# Patient Record
Sex: MALE | Race: WHITE | NOT HISPANIC OR LATINO | Employment: FULL TIME | URBAN - METROPOLITAN AREA
[De-identification: names, ages, dates, MRNs, and addresses within clinical notes are randomized per-mention and may not be internally consistent; named-entity substitution may affect disease eponyms.]

---

## 2017-08-03 ENCOUNTER — HOSPITAL ENCOUNTER (OUTPATIENT)
Dept: ORTHOPEDIC SURGERY | Facility: CLINIC | Age: 57
Discharge: HOME | End: 2017-08-03
Attending: PODIATRIST | Admitting: PODIATRIST

## 2017-09-25 ENCOUNTER — HOSPITAL ENCOUNTER (OUTPATIENT)
Dept: FAMILY MEDICINE CLINIC | Facility: CLINIC | Age: 57
Setting detail: SPECIMEN
Discharge: HOME | End: 2017-09-25
Attending: NURSE PRACTITIONER | Admitting: NURSE PRACTITIONER

## 2019-09-19 RX ORDER — ATENOLOL 50 MG/1
TABLET ORAL
Qty: 180 TABLET | Refills: 4 | Status: SHIPPED | OUTPATIENT
Start: 2019-09-19 | End: 2019-12-04 | Stop reason: SDUPTHER

## 2019-09-23 RX ORDER — LOSARTAN POTASSIUM 25 MG/1
TABLET ORAL
Qty: 90 TABLET | Refills: 4 | Status: SHIPPED | OUTPATIENT
Start: 2019-09-23 | End: 2019-09-25 | Stop reason: SDUPTHER

## 2019-09-25 ENCOUNTER — TELEPHONE (OUTPATIENT)
Dept: FAMILY MEDICINE CLINIC | Facility: CLINIC | Age: 59
End: 2019-09-25

## 2019-09-25 RX ORDER — LOSARTAN POTASSIUM 25 MG/1
25 TABLET ORAL DAILY
Qty: 90 TABLET | Refills: 2 | Status: SHIPPED | OUTPATIENT
Start: 2019-09-25 | End: 2019-12-04 | Stop reason: SDUPTHER

## 2019-09-25 NOTE — TELEPHONE ENCOUNTER
Patient is waiting on mail order to arrive and will run out of Losartan 25mg once daily.  Needs about 10 days sent in please.

## 2019-11-12 ENCOUNTER — TELEPHONE (OUTPATIENT)
Dept: FAMILY MEDICINE CLINIC | Facility: CLINIC | Age: 59
End: 2019-11-12

## 2019-11-12 NOTE — TELEPHONE ENCOUNTER
Pt is coming in for fasting labs on 11/22/19 for his physical. Please put lab order in. Thank you.

## 2019-11-13 NOTE — TELEPHONE ENCOUNTER
Are there specific labs you drawl for a physical? Please advise with what labs you would like drawn.

## 2019-11-14 DIAGNOSIS — Z00.00 ANNUAL PHYSICAL EXAM: Primary | ICD-10-CM

## 2019-11-14 DIAGNOSIS — Z12.5 SCREENING FOR PROSTATE CANCER: ICD-10-CM

## 2019-11-21 ENCOUNTER — LAB (OUTPATIENT)
Dept: FAMILY MEDICINE CLINIC | Facility: CLINIC | Age: 59
End: 2019-11-21

## 2019-11-21 DIAGNOSIS — Z12.5 SCREENING FOR PROSTATE CANCER: ICD-10-CM

## 2019-11-21 DIAGNOSIS — Z00.00 ANNUAL PHYSICAL EXAM: ICD-10-CM

## 2019-11-21 LAB
ALBUMIN SERPL-MCNC: 4 G/DL (ref 3.5–5.2)
ALBUMIN/GLOB SERPL: 1.2 G/DL
ALP SERPL-CCNC: 54 U/L (ref 39–117)
ALT SERPL W P-5'-P-CCNC: 26 U/L (ref 1–41)
ANION GAP SERPL CALCULATED.3IONS-SCNC: 14.1 MMOL/L (ref 5–15)
AST SERPL-CCNC: 19 U/L (ref 1–40)
BASOPHILS # BLD AUTO: 0.04 10*3/MM3 (ref 0–0.2)
BASOPHILS NFR BLD AUTO: 0.6 % (ref 0–1.5)
BILIRUB SERPL-MCNC: 2.2 MG/DL (ref 0.2–1.2)
BUN BLD-MCNC: 18 MG/DL (ref 8–23)
BUN/CREAT SERPL: 18.6 (ref 7–25)
CALCIUM SPEC-SCNC: 9.2 MG/DL (ref 8.6–10.5)
CHLORIDE SERPL-SCNC: 102 MMOL/L (ref 98–107)
CHOLEST SERPL-MCNC: 155 MG/DL (ref 0–200)
CO2 SERPL-SCNC: 24.9 MMOL/L (ref 22–29)
CREAT BLD-MCNC: 0.97 MG/DL (ref 0.76–1.27)
DEPRECATED RDW RBC AUTO: 40.1 FL (ref 37–54)
EOSINOPHIL # BLD AUTO: 0.18 10*3/MM3 (ref 0–0.4)
EOSINOPHIL NFR BLD AUTO: 2.9 % (ref 0.3–6.2)
ERYTHROCYTE [DISTWIDTH] IN BLOOD BY AUTOMATED COUNT: 12.1 % (ref 12.3–15.4)
GFR SERPL CREATININE-BSD FRML MDRD: 79 ML/MIN/1.73
GLOBULIN UR ELPH-MCNC: 3.4 GM/DL
GLUCOSE BLD-MCNC: 102 MG/DL (ref 65–99)
HBA1C MFR BLD: 4.6 % (ref 3.5–5.6)
HCT VFR BLD AUTO: 47.3 % (ref 37.5–51)
HDLC SERPL-MCNC: 42 MG/DL (ref 40–60)
HGB BLD-MCNC: 16.4 G/DL (ref 13–17.7)
IMM GRANULOCYTES # BLD AUTO: 0.03 10*3/MM3 (ref 0–0.05)
IMM GRANULOCYTES NFR BLD AUTO: 0.5 % (ref 0–0.5)
LDLC SERPL CALC-MCNC: 96 MG/DL (ref 0–100)
LDLC/HDLC SERPL: 2.28 {RATIO}
LYMPHOCYTES # BLD AUTO: 1.31 10*3/MM3 (ref 0.7–3.1)
LYMPHOCYTES NFR BLD AUTO: 21.2 % (ref 19.6–45.3)
MCH RBC QN AUTO: 31.2 PG (ref 26.6–33)
MCHC RBC AUTO-ENTMCNC: 34.7 G/DL (ref 31.5–35.7)
MCV RBC AUTO: 89.9 FL (ref 79–97)
MONOCYTES # BLD AUTO: 0.55 10*3/MM3 (ref 0.1–0.9)
MONOCYTES NFR BLD AUTO: 8.9 % (ref 5–12)
NEUTROPHILS # BLD AUTO: 4.06 10*3/MM3 (ref 1.7–7)
NEUTROPHILS NFR BLD AUTO: 65.9 % (ref 42.7–76)
NRBC BLD AUTO-RTO: 0 /100 WBC (ref 0–0.2)
PLATELET # BLD AUTO: 251 10*3/MM3 (ref 140–450)
PMV BLD AUTO: 9.8 FL (ref 6–12)
POTASSIUM BLD-SCNC: 4.1 MMOL/L (ref 3.5–5.2)
PROT SERPL-MCNC: 7.4 G/DL (ref 6–8.5)
PSA SERPL-MCNC: 2.73 NG/ML (ref 0–4)
RBC # BLD AUTO: 5.26 10*6/MM3 (ref 4.14–5.8)
SODIUM BLD-SCNC: 141 MMOL/L (ref 136–145)
TRIGL SERPL-MCNC: 87 MG/DL (ref 0–150)
VLDLC SERPL-MCNC: 17.4 MG/DL (ref 5–40)
WBC NRBC COR # BLD: 6.17 10*3/MM3 (ref 3.4–10.8)

## 2019-11-21 PROCEDURE — 80061 LIPID PANEL: CPT | Performed by: FAMILY MEDICINE

## 2019-11-21 PROCEDURE — 36415 COLL VENOUS BLD VENIPUNCTURE: CPT

## 2019-11-21 PROCEDURE — G0103 PSA SCREENING: HCPCS | Performed by: FAMILY MEDICINE

## 2019-11-21 PROCEDURE — 80053 COMPREHEN METABOLIC PANEL: CPT | Performed by: FAMILY MEDICINE

## 2019-11-21 PROCEDURE — 85025 COMPLETE CBC W/AUTO DIFF WBC: CPT | Performed by: FAMILY MEDICINE

## 2019-11-21 PROCEDURE — 83036 HEMOGLOBIN GLYCOSYLATED A1C: CPT | Performed by: FAMILY MEDICINE

## 2019-12-04 ENCOUNTER — OFFICE VISIT (OUTPATIENT)
Dept: FAMILY MEDICINE CLINIC | Facility: CLINIC | Age: 59
End: 2019-12-04

## 2019-12-04 ENCOUNTER — RESULTS ENCOUNTER (OUTPATIENT)
Dept: FAMILY MEDICINE CLINIC | Facility: CLINIC | Age: 59
End: 2019-12-04

## 2019-12-04 VITALS
HEART RATE: 76 BPM | DIASTOLIC BLOOD PRESSURE: 74 MMHG | SYSTOLIC BLOOD PRESSURE: 122 MMHG | BODY MASS INDEX: 32.05 KG/M2 | TEMPERATURE: 97.8 F | RESPIRATION RATE: 16 BRPM | WEIGHT: 277 LBS | HEIGHT: 78 IN

## 2019-12-04 DIAGNOSIS — Z00.00 ANNUAL VISIT FOR GENERAL ADULT MEDICAL EXAMINATION WITHOUT ABNORMAL FINDINGS: Primary | ICD-10-CM

## 2019-12-04 DIAGNOSIS — R14.0 GASEOUS ABDOMINAL DISTENTION: ICD-10-CM

## 2019-12-04 DIAGNOSIS — Z00.00 ANNUAL VISIT FOR GENERAL ADULT MEDICAL EXAMINATION WITHOUT ABNORMAL FINDINGS: ICD-10-CM

## 2019-12-04 DIAGNOSIS — I10 ESSENTIAL HYPERTENSION: ICD-10-CM

## 2019-12-04 PROBLEM — M21.969 ACQUIRED DEFORMITY OF ANKLE AND FOOT: Status: ACTIVE | Noted: 2017-08-03

## 2019-12-04 PROBLEM — M25.372 OTHER INSTABILITY, LEFT ANKLE: Status: ACTIVE | Noted: 2017-08-03

## 2019-12-04 PROBLEM — I49.3 PVCS (PREMATURE VENTRICULAR CONTRACTIONS): Status: ACTIVE | Noted: 2017-12-05

## 2019-12-04 PROBLEM — M25.572 ANKLE PAIN, LEFT: Status: ACTIVE | Noted: 2017-08-03

## 2019-12-04 PROBLEM — B37.42 CANDIDAL BALANITIS: Status: ACTIVE | Noted: 2018-05-31

## 2019-12-04 PROCEDURE — 90715 TDAP VACCINE 7 YRS/> IM: CPT | Performed by: FAMILY MEDICINE

## 2019-12-04 PROCEDURE — 99396 PREV VISIT EST AGE 40-64: CPT | Performed by: FAMILY MEDICINE

## 2019-12-04 PROCEDURE — 90471 IMMUNIZATION ADMIN: CPT | Performed by: FAMILY MEDICINE

## 2019-12-04 RX ORDER — LOSARTAN POTASSIUM 25 MG/1
25 TABLET ORAL DAILY
Qty: 90 TABLET | Refills: 2 | Status: SHIPPED | OUTPATIENT
Start: 2019-12-04 | End: 2020-09-28

## 2019-12-04 RX ORDER — ATENOLOL 50 MG/1
50 TABLET ORAL 2 TIMES DAILY
Qty: 180 TABLET | Refills: 3 | Status: SHIPPED | OUTPATIENT
Start: 2019-12-04 | End: 2020-11-30

## 2019-12-04 RX ORDER — ASPIRIN 81 MG/1
TABLET, CHEWABLE ORAL
COMMUNITY
Start: 2015-12-10

## 2019-12-04 NOTE — PROGRESS NOTES
Chief Complaint   Patient presents with   • Annual Exam     HPI  Olivier White is a 59 y.o. male that presents for annual visit.    Gas/distention: notices across abdomen. Uncomfortable, not painful. Mostly on abdominal wall rather than intraabdominal. Ongoing for years.     HTN: 122/74 today. Compliant w/ atenolol and losartan    Hx skin cancer: nothing active. Follows w/ dermatology    Gout: 2 flares in 10-12years. On no meds    Review of Systems   Constitutional: Negative for chills, fever and unexpected weight loss.   HENT: Positive for congestion. Negative for rhinorrhea and sore throat.    Eyes: Negative for blurred vision, double vision and visual disturbance.   Respiratory: Positive for cough. Negative for shortness of breath.    Cardiovascular: Negative for chest pain and palpitations.   Gastrointestinal: Positive for abdominal pain. Negative for constipation, diarrhea, nausea and vomiting.   Genitourinary: Negative for dysuria, frequency and urgency.   Musculoskeletal: Negative for arthralgias and joint swelling.   Skin: Negative for rash and skin lesions.   Neurological: Negative for weakness and headache.   Psychiatric/Behavioral: Negative for depressed mood. The patient is not nervous/anxious.      The following portions of the patient's history were reviewed and updated as appropriate: problem list, past medical history, past surgical history, allergies, current medications, past social history and past family history.    Problem List Tab  Patient History Tab  Immunizations Tab  Medications Tab  Chart Review Tab  Care Everywhere Tab  Synopsis Tab    PE  Vitals:    12/04/19 1502   BP: 122/74   Pulse: 76   Resp: 16   Temp: 97.8 °F (36.6 °C)     Body mass index is 31.21 kg/m².  General: Well nourished, NAD  Head: AT/NC  Eyes: EOMI, anicteric sclera  ENT: MMM w/o erythema  Neck: Supple, no thyromegaly or LAD  Resp: CTAB, SCR, BS equal  CV: RRR w/o m/r/g; 2+ pulses  GI: Soft, NT/ND, +BS  MSK: FROM, no  deformity, no edema  Skin: Warm, dry, intact  Neuro: Alert and oriented. No focal deficits  Psych: Appropriate mood and affect    Imaging  No Images in the past 120 days found..    Assessment/Plan   Olivier White is a 59 y.o. male that presents for   Chief Complaint   Patient presents with   • Annual Exam     Olivier was seen today for annual exam.    Diagnoses and all orders for this visit:    Annual visit for general adult medical examination without abnormal findings  -     Cologuard - Stool, Per Rectum; Future  -     Tdap Vaccine Greater Than or Equal To 8yo IM    Essential hypertension: 122/74 today, well controlled on meds  -     atenolol (TENORMIN) 50 MG tablet; Take 1 tablet by mouth 2 (Two) Times a Day.  -     losartan (COZAAR) 25 MG tablet; Take 1 tablet by mouth Daily.    Gaseous abdominal distention:   -     Simethicone 80 MG tablet; Take 1 tablet by mouth Every 6 (Six) Hours As Needed (gas, distention).    Preventative  Colonoscopy: 2008; Cologuard ordered today  PSA: 2.7 11/2019  Shingles: 2016  Tdap: ordered today  Influenza: declined    F/U in 1 year for annual visit

## 2019-12-04 NOTE — PATIENT INSTRUCTIONS
General congestion recommendations:  Ibuprofen 800mg Q8 PRN  Afrin nasal spray (no more than 3 days)  Flonase nasal spray  Guaifenesin/Pseudoephedrine

## 2020-03-02 ENCOUNTER — TELEPHONE (OUTPATIENT)
Dept: FAMILY MEDICINE CLINIC | Facility: CLINIC | Age: 60
End: 2020-03-02

## 2020-03-27 ENCOUNTER — TELEPHONE (OUTPATIENT)
Dept: FAMILY MEDICINE CLINIC | Facility: CLINIC | Age: 60
End: 2020-03-27

## 2020-03-28 RX ORDER — MECLIZINE HYDROCHLORIDE 25 MG/1
25 TABLET ORAL 3 TIMES DAILY PRN
Qty: 30 TABLET | Refills: 1 | Status: SHIPPED | OUTPATIENT
Start: 2020-03-28 | End: 2020-08-27

## 2020-04-29 DIAGNOSIS — R14.0 GASEOUS ABDOMINAL DISTENTION: ICD-10-CM

## 2020-04-29 RX ORDER — SIMETHICONE 80 MG/1
TABLET, CHEWABLE ORAL
Qty: 60 TABLET | Refills: 2 | Status: SHIPPED | OUTPATIENT
Start: 2020-04-29 | End: 2020-12-04

## 2020-08-26 ENCOUNTER — HOSPITAL ENCOUNTER (OUTPATIENT)
Dept: CARDIOLOGY | Facility: HOSPITAL | Age: 60
Discharge: HOME | End: 2020-08-26
Admitting: OPHTHALMOLOGY

## 2020-08-26 ENCOUNTER — LAB (OUTPATIENT)
Dept: LAB | Facility: HOSPITAL | Age: 60
End: 2020-08-26

## 2020-08-26 ENCOUNTER — TRANSCRIBE ORDERS (OUTPATIENT)
Dept: ADMINISTRATIVE | Facility: HOSPITAL | Age: 60
End: 2020-08-26

## 2020-08-26 DIAGNOSIS — Z01.818 PRE-OP TESTING: Primary | ICD-10-CM

## 2020-08-26 DIAGNOSIS — Z01.818 PRE-OP TESTING: ICD-10-CM

## 2020-08-26 LAB
ANION GAP SERPL CALCULATED.3IONS-SCNC: 9.5 MMOL/L (ref 5–15)
BUN SERPL-MCNC: 11 MG/DL (ref 8–23)
BUN/CREAT SERPL: 10.7 (ref 7–25)
CALCIUM SPEC-SCNC: 9.6 MG/DL (ref 8.6–10.5)
CHLORIDE SERPL-SCNC: 104 MMOL/L (ref 98–107)
CO2 SERPL-SCNC: 27.5 MMOL/L (ref 22–29)
CREAT SERPL-MCNC: 1.03 MG/DL (ref 0.76–1.27)
GFR SERPL CREATININE-BSD FRML MDRD: 73 ML/MIN/1.73
GLUCOSE SERPL-MCNC: 83 MG/DL (ref 65–99)
POTASSIUM SERPL-SCNC: 4.5 MMOL/L (ref 3.5–5.2)
SODIUM SERPL-SCNC: 141 MMOL/L (ref 136–145)

## 2020-08-26 PROCEDURE — 36415 COLL VENOUS BLD VENIPUNCTURE: CPT

## 2020-08-26 PROCEDURE — 93005 ELECTROCARDIOGRAM TRACING: CPT | Performed by: OPHTHALMOLOGY

## 2020-08-26 PROCEDURE — 80048 BASIC METABOLIC PNL TOTAL CA: CPT

## 2020-08-26 PROCEDURE — 93010 ELECTROCARDIOGRAM REPORT: CPT | Performed by: INTERNAL MEDICINE

## 2020-08-27 RX ORDER — MECLIZINE HYDROCHLORIDE 25 MG/1
TABLET ORAL
Qty: 30 TABLET | Refills: 1 | Status: SHIPPED | OUTPATIENT
Start: 2020-08-27

## 2020-09-26 DIAGNOSIS — I10 ESSENTIAL HYPERTENSION: ICD-10-CM

## 2020-09-28 ENCOUNTER — HOSPITAL ENCOUNTER (OUTPATIENT)
Facility: HOSPITAL | Age: 60
Setting detail: HOSPITAL OUTPATIENT SURGERY
Discharge: HOME | End: 2020-09-28
Attending: OPHTHALMOLOGY | Admitting: OPHTHALMOLOGY

## 2020-09-28 RX ORDER — LOSARTAN POTASSIUM 25 MG/1
TABLET ORAL
Qty: 90 TABLET | Refills: 3 | Status: SHIPPED | OUTPATIENT
Start: 2020-09-28 | End: 2021-06-24 | Stop reason: SDUPTHER

## 2020-10-05 VITALS — BODY MASS INDEX: 31.24 KG/M2 | HEIGHT: 78 IN | WEIGHT: 270 LBS

## 2020-10-12 ENCOUNTER — ANESTHESIA EVENT (OUTPATIENT)
Dept: PERIOP | Facility: HOSPITAL | Age: 60
End: 2020-10-12

## 2020-10-12 RX ORDER — SODIUM CHLORIDE 0.9 % (FLUSH) 0.9 %
10 SYRINGE (ML) INJECTION AS NEEDED
Status: CANCELLED | OUTPATIENT
Start: 2020-10-12

## 2020-10-12 RX ORDER — SODIUM CHLORIDE 0.9 % (FLUSH) 0.9 %
10 SYRINGE (ML) INJECTION EVERY 12 HOURS SCHEDULED
Status: CANCELLED | OUTPATIENT
Start: 2020-10-12

## 2020-10-12 RX ORDER — SODIUM CHLORIDE, SODIUM LACTATE, POTASSIUM CHLORIDE, CALCIUM CHLORIDE 600; 310; 30; 20 MG/100ML; MG/100ML; MG/100ML; MG/100ML
9 INJECTION, SOLUTION INTRAVENOUS CONTINUOUS PRN
Status: CANCELLED | OUTPATIENT
Start: 2020-10-12

## 2020-10-13 ENCOUNTER — ANESTHESIA (OUTPATIENT)
Dept: PERIOP | Facility: HOSPITAL | Age: 60
End: 2020-10-13

## 2020-10-19 ENCOUNTER — LAB (OUTPATIENT)
Dept: LAB | Facility: HOSPITAL | Age: 60
End: 2020-10-19

## 2020-10-19 LAB
ANION GAP SERPL CALCULATED.3IONS-SCNC: 9.5 MMOL/L (ref 5–15)
BUN SERPL-MCNC: 15 MG/DL (ref 8–23)
BUN/CREAT SERPL: 14.6 (ref 7–25)
CALCIUM SPEC-SCNC: 9.4 MG/DL (ref 8.6–10.5)
CHLORIDE SERPL-SCNC: 104 MMOL/L (ref 98–107)
CO2 SERPL-SCNC: 27.5 MMOL/L (ref 22–29)
CREAT SERPL-MCNC: 1.03 MG/DL (ref 0.76–1.27)
GFR SERPL CREATININE-BSD FRML MDRD: 73 ML/MIN/1.73
GLUCOSE SERPL-MCNC: 86 MG/DL (ref 65–99)
POTASSIUM SERPL-SCNC: 4.4 MMOL/L (ref 3.5–5.2)
SODIUM SERPL-SCNC: 141 MMOL/L (ref 136–145)

## 2020-10-19 PROCEDURE — 80048 BASIC METABOLIC PNL TOTAL CA: CPT | Performed by: OPHTHALMOLOGY

## 2020-10-19 PROCEDURE — 36415 COLL VENOUS BLD VENIPUNCTURE: CPT | Performed by: OPHTHALMOLOGY

## 2020-11-28 DIAGNOSIS — I10 ESSENTIAL HYPERTENSION: ICD-10-CM

## 2020-11-30 RX ORDER — ATENOLOL 50 MG/1
TABLET ORAL
Qty: 180 TABLET | Refills: 3 | Status: SHIPPED | OUTPATIENT
Start: 2020-11-30 | End: 2021-06-24 | Stop reason: SDUPTHER

## 2020-12-03 DIAGNOSIS — R14.0 GASEOUS ABDOMINAL DISTENTION: ICD-10-CM

## 2020-12-04 RX ORDER — SIMETHICONE 80 MG/1
TABLET, CHEWABLE ORAL
Qty: 60 TABLET | Refills: 2 | Status: SHIPPED | OUTPATIENT
Start: 2020-12-04 | End: 2021-04-27

## 2020-12-08 ENCOUNTER — TELEPHONE (OUTPATIENT)
Dept: FAMILY MEDICINE CLINIC | Facility: CLINIC | Age: 60
End: 2020-12-08

## 2020-12-08 RX ORDER — CLOTRIMAZOLE 1 %
CREAM (GRAM) TOPICAL 2 TIMES DAILY
Qty: 60 G | Refills: 1 | Status: SHIPPED | OUTPATIENT
Start: 2020-12-08

## 2020-12-08 NOTE — TELEPHONE ENCOUNTER
PATIENT PRESCRIBED NYSTATIN CREME TO TREAT A RASH INSIDE HIS CROUCH.  AND REQUEST A PRESCRIPTION,  PLEASE ADVISE    CALL BACK #: [phone]     PHARMACY:[id number] [place] [facility] [address] - [phone]  - [phone] FX

## 2020-12-08 NOTE — TELEPHONE ENCOUNTER
Clotrimazole ordered to [facility].  If not improving, needs appointment.  It looks like he is due for a physical anyway.

## 2021-03-09 ENCOUNTER — HOSPITAL ENCOUNTER (OUTPATIENT)
Dept: CARDIOLOGY | Facility: HOSPITAL | Age: 61
Discharge: HOME | End: 2021-03-09

## 2021-03-09 ENCOUNTER — LAB (OUTPATIENT)
Dept: LAB | Facility: HOSPITAL | Age: 61
End: 2021-03-09

## 2021-03-09 ENCOUNTER — TRANSCRIBE ORDERS (OUTPATIENT)
Dept: ADMINISTRATIVE | Facility: HOSPITAL | Age: 61
End: 2021-03-09

## 2021-03-09 DIAGNOSIS — Z01.818 PRE-OP TESTING: Primary | ICD-10-CM

## 2021-03-09 DIAGNOSIS — Z01.818 PRE-OP TESTING: ICD-10-CM

## 2021-03-09 LAB
ANION GAP SERPL CALCULATED.3IONS-SCNC: 10.7 MMOL/L (ref 5–15)
BUN SERPL-MCNC: 15 MG/DL (ref 8–23)
BUN/CREAT SERPL: 14.6 (ref 7–25)
CALCIUM SPEC-SCNC: 9.2 MG/DL (ref 8.6–10.5)
CHLORIDE SERPL-SCNC: 105 MMOL/L (ref 98–107)
CO2 SERPL-SCNC: 27.3 MMOL/L (ref 22–29)
CREAT SERPL-MCNC: 1.03 MG/DL (ref 0.76–1.27)
GFR SERPL CREATININE-BSD FRML MDRD: 73 ML/MIN/1.73
GLUCOSE SERPL-MCNC: 90 MG/DL (ref 65–99)
POTASSIUM SERPL-SCNC: 4 MMOL/L (ref 3.5–5.2)
SODIUM SERPL-SCNC: 143 MMOL/L (ref 136–145)

## 2021-03-09 PROCEDURE — 80048 BASIC METABOLIC PNL TOTAL CA: CPT

## 2021-03-09 PROCEDURE — 36415 COLL VENOUS BLD VENIPUNCTURE: CPT

## 2021-03-09 PROCEDURE — 93010 ELECTROCARDIOGRAM REPORT: CPT | Performed by: INTERNAL MEDICINE

## 2021-03-09 PROCEDURE — 93005 ELECTROCARDIOGRAM TRACING: CPT | Performed by: OPHTHALMOLOGY

## 2021-03-10 PROBLEM — Z01.818 PRE-OP EVALUATION: Status: ACTIVE | Noted: 2021-03-10

## 2021-03-10 LAB — QT INTERVAL: 439 MS

## 2021-03-10 NOTE — PROGRESS NOTES
Reason for consultation: pre-op     Referring physician: Dr. Tyler Calhoun    [facility]. Mr. Olivier White is an 61 year old male that presents today for pre-op clearance.  He was referred by preadmission testing because of abnormal EKG limited to one isolated PVC versus aberrantly conducted PAC, with no ST nor T wave normalities and no evidence of previous microinfarction.    On specific questioning, Mr. Wihte denies any prior history of ischemic coronary events, valvular heart disease, cardiomyopathy or cardiac dysrhythmia.  More specifically, denies any recent complaints of chest pain or other anginal equivalent symptoms has not had PND orthopnea or bipedal edema to suggest congestive heart failure he has no prior clinical evidence of microinfarction in the recent or even remote past.    He is generally functionally active for age and has no specific cardiopulmonary or other symptoms at this time.  He is scheduled to undergo elective retinal surgery per Dr. Howard Lazarus.    He does have a history of essential hypertension which appears to be well regulated on 2 drug regimen including atenolol and low-dose losartan.  He also has history obstructive sleep apnea for which he uses CPAP routinely and had previous traumatic injury in left ankle with modest residual deficits.    He is noted to be taking aspirin 81 mg p.o. daily for no clear indications.  He tells me a doctor 10 or 15 years or so ago told me that would be a good idea no is no confirmation of ischemic coronary disease or cerebrovascular disease.     Past Medical History:   Diagnosis Date   • Anesthesia complication     slow to arouse from full anesthesia   • Arthritis     left foot   • Gout    • Heart murmur    • Hypertension 9/25/2017   • Lipoma 1/7/2016   • Skin cancer of face    • Sleep apnea     does not use machine       Past Surgical History:   Procedure Laterality Date   • SKIN CANCER EXCISION     • TENDON LENGTHEN/SHORTEN ANKLE/LEG      repair   •  TOE AMPUTATION     • VASECTOMY         Family History   Problem Relation Age of Onset   • Hypertension Mother    • Stomach cancer Father 65   • Hypertension Sister    • Skin cancer Brother    • No Known Problems Maternal Grandmother    • No Known Problems Maternal Grandfather    • No Known Problems Paternal Grandmother    • No Known Problems Paternal Grandfather        Social History     Socioeconomic History   • Marital status:      Spouse name: Not on file   • Number of children: Not on file   • Years of education: Not on file   • Highest education level: Not on file   Tobacco Use   • Smoking status: Former Smoker     Packs/day: 0.25     Years: 10.00     Pack years: 2.50     Types: Cigarettes     Quit date:      Years since quittin.2   • Smokeless tobacco: Never Used   Vaping Use   • Vaping Use: Never used   Substance and Sexual Activity   • Alcohol use: Yes     Comment: occ   • Drug use: No   • Sexual activity: Defer         Review of Systems   General: denies fever, chills, anorexia, weight loss  Eyes: denies blurring, diplopia; does have retinal issues for which she is seeing Dr. Howard Lazarus-see HPI  Ear/Nose/Throat: denies ear pain, nosebleeds, hoarseness  Cardiovascular: See HPI  Respiratory: denies excessive sputum, hemoptysis, wheezing  Gastrointestinal: denies nausea, vomiting, change in bowel habits, abdominal pain  Genitourinary: denies dysuria and hematuria and no significant nocturia  Musculoskeletal: denies back pain, joint pain, joint swelling, muscle cramps, weakness  Skin: denies rashes, itching, suspicious lesions  Neurologic: denies focal neuro deficits  Psychiatric: denies depression, anxiety  Endocrine: denies cold intolerance, heat intolerance  Hematologic/Lymphatic: denies abnormal bruising, bleeding  Allergic/Immunologic: denies urticaria or persistent infections    Allergies: Atorvastatin    Current Meds:.  Current Outpatient Medications   Medication Sig Dispense  "Refill   • aspirin 81 MG chewable tablet ASPIRIN CHILDRENS 81 MG CHEW     • atenolol (TENORMIN) 50 MG tablet TAKE 1 TABLET TWICE A DAY (Patient taking differently: Take 50 mg by mouth Daily.) 180 tablet 3   • losartan (COZAAR) 25 MG tablet TAKE 1 TABLET DAILY 90 tablet 3   • Mi-Acid Gas Relief 80 MG chewable tablet CHEW AND SWALLOW 1 TABLET BY MOUTH EVERY 6 HOURS AS NEEDED FOR GAS 60 tablet 2   • clotrimazole (LOTRIMIN) 1 % cream Apply  topically to the appropriate area as directed 2 (Two) Times a Day. 60 g 1   • meclizine (ANTIVERT) 25 MG tablet TAKE 1 TABLET BY MOUTH THREE TIMES DAILY AS NEEDED FOR DIZZINESS 30 tablet 1     No current facility-administered medications for this visit.       Objective     VITAL SIGNS  /84   Pulse 66   Ht 200.7 cm (79\")   Wt 128 kg (282 lb)   BMI 31.77 kg/m²      Physical Exam:  General:    Mildly  Obese, well developed,, in no acute distress.    Head:     normocephalic and atraumatic.    Eyes:    PERRL/EOM intact, conjunctiva and sclera clear with out nystagmus.    Neck:    no jvd or bruits  Chest Wall:    no deformities   Lungs:    clear bilaterally to auscultation with adequate global airflow   Heart:    non-displaced PMI; regular rate and rhythm, normal S1, S2 without murmurs, rubs, or gallops  Abdomen:  Soft, nontender without HSM  Msk:    no deformity; adequate R OM aside from minor defect involving left ankle from remote fracture  Pulses:    pulses normal in all 4 extremities.    Extremities:    no clubbing, cyanosis, edema   Neurologic:    no focal sensory or motor deficits  Skin:    intact without lesions or rashes.    Psych:    alert and cooperative; normal mood and affect; normal attention span and concentration.             Results Review:      ECG 12 Lead    Date/Time: 3/10/2021 5:50 PM  Performed by: ANABELLA Calhoun MD  Authorized by: ANABELLA Calhoun MD   Comparison: compared with previous ECG from 3/9/2021  Similar to previous ECG  Comparison to previous " ECG: Previous tracing showed 1 isolated PVC versus aberrantly conducted PAC  Rhythm: sinus rhythm  Conduction: non-specific intraventricular conduction delay    Clinical impression: normal ECG and non-specific ECG  Comments: Current tracing shows sinus rhythm with borderline nonspecific IVCD; QRS duration 110 ms otherwise normal EKG.  No ectopic beats on current tracing.            ECHOCARDIOGRAM: No previous    STRESS MYOVIEW: No previous    CARDIAC CATHETERIZATION: No previous    OTHER:     Imaging Results (Last 24 Hours)     ** No results found for the last 24 hours. **                        Assessment/Plan     #1 61 WM cleared for elective retinal surgery despite very modest abnormality on preop EKG with 1 isolated PVC versus aberrantly conducted PAC  --No evidence of unstable angina pectoris, acute microinfarction or congestive heart failure that prohibit elective noncardiac surgery at this time.    #2 essential hypertension-appears well regulated on combination atenolol plus low-dose losartan      No additional cardiac evaluation is warranted at this time.  He is encouraged to continue with current antihypertensive regimen including atenolol and low-dose losartan.  I pointed out he has no evidence of ischemic coronary disease or cerebrovascular disease that would warrant continuing on aspirin therapy.  He is advised to follow-up with primary care physician and return to our office on an as needed basis in the future should he develop further cardiac issues.        ANABELLA Calhoun MD  3/14/2021 21:29 EDT    This report was generated using the Dragon voice recognition system.

## 2021-03-11 ENCOUNTER — OFFICE VISIT (OUTPATIENT)
Dept: CARDIOLOGY | Facility: CLINIC | Age: 61
End: 2021-03-11

## 2021-03-11 VITALS
BODY MASS INDEX: 32.63 KG/M2 | DIASTOLIC BLOOD PRESSURE: 84 MMHG | HEART RATE: 66 BPM | HEIGHT: 78 IN | SYSTOLIC BLOOD PRESSURE: 130 MMHG | WEIGHT: 282 LBS

## 2021-03-11 DIAGNOSIS — R94.31 ABNORMAL EKG: ICD-10-CM

## 2021-03-11 DIAGNOSIS — I10 ESSENTIAL HYPERTENSION: ICD-10-CM

## 2021-03-11 DIAGNOSIS — Z01.818 ENCOUNTER FOR PREOPERATIVE ANESTHESIOLOGY ASSESSMENT FOR CARDIAC SURGERY: Primary | ICD-10-CM

## 2021-03-11 PROCEDURE — 99204 OFFICE O/P NEW MOD 45 MIN: CPT | Performed by: INTERNAL MEDICINE

## 2021-03-11 PROCEDURE — 93000 ELECTROCARDIOGRAM COMPLETE: CPT | Performed by: INTERNAL MEDICINE

## 2021-04-26 DIAGNOSIS — R14.0 GASEOUS ABDOMINAL DISTENTION: ICD-10-CM

## 2021-04-27 RX ORDER — SIMETHICONE 80 MG/1
TABLET, CHEWABLE ORAL
Qty: 60 TABLET | Refills: 2 | Status: SHIPPED | OUTPATIENT
Start: 2021-04-27

## 2021-05-20 ENCOUNTER — TELEPHONE (OUTPATIENT)
Dept: FAMILY MEDICINE CLINIC | Facility: CLINIC | Age: 61
End: 2021-05-20

## 2021-05-20 DIAGNOSIS — Z12.5 SCREENING FOR PROSTATE CANCER: ICD-10-CM

## 2021-05-20 DIAGNOSIS — I10 ESSENTIAL HYPERTENSION: Primary | ICD-10-CM

## 2021-05-20 DIAGNOSIS — Z13.220 ENCOUNTER FOR SCREENING FOR LIPOID DISORDERS: ICD-10-CM

## 2021-05-26 ENCOUNTER — LAB (OUTPATIENT)
Dept: LAB | Facility: HOSPITAL | Age: 61
End: 2021-05-26

## 2021-05-26 DIAGNOSIS — Z12.5 SCREENING FOR PROSTATE CANCER: ICD-10-CM

## 2021-05-26 DIAGNOSIS — I10 ESSENTIAL HYPERTENSION: ICD-10-CM

## 2021-05-26 DIAGNOSIS — Z13.220 ENCOUNTER FOR SCREENING FOR LIPOID DISORDERS: ICD-10-CM

## 2021-05-26 LAB
ALBUMIN SERPL-MCNC: 4.1 G/DL (ref 3.5–5.2)
ALBUMIN/GLOB SERPL: 1.5 G/DL
ALP SERPL-CCNC: 52 U/L (ref 39–117)
ALT SERPL W P-5'-P-CCNC: 34 U/L (ref 1–41)
ANION GAP SERPL CALCULATED.3IONS-SCNC: 9 MMOL/L (ref 5–15)
AST SERPL-CCNC: 24 U/L (ref 1–40)
BILIRUB SERPL-MCNC: 1.6 MG/DL (ref 0–1.2)
BUN SERPL-MCNC: 16 MG/DL (ref 8–23)
BUN/CREAT SERPL: 16.3 (ref 7–25)
CALCIUM SPEC-SCNC: 8.7 MG/DL (ref 8.6–10.5)
CHLORIDE SERPL-SCNC: 107 MMOL/L (ref 98–107)
CHOLEST SERPL-MCNC: 171 MG/DL (ref 0–200)
CO2 SERPL-SCNC: 25 MMOL/L (ref 22–29)
CREAT SERPL-MCNC: 0.98 MG/DL (ref 0.76–1.27)
GFR SERPL CREATININE-BSD FRML MDRD: 77 ML/MIN/1.73
GLOBULIN UR ELPH-MCNC: 2.7 GM/DL
GLUCOSE SERPL-MCNC: 102 MG/DL (ref 65–99)
HDLC SERPL-MCNC: 45 MG/DL (ref 40–60)
LDLC SERPL CALC-MCNC: 106 MG/DL (ref 0–100)
LDLC/HDLC SERPL: 2.31 {RATIO}
POTASSIUM SERPL-SCNC: 4 MMOL/L (ref 3.5–5.2)
PROT SERPL-MCNC: 6.8 G/DL (ref 6–8.5)
PSA SERPL-MCNC: 3.89 NG/ML (ref 0–4)
SODIUM SERPL-SCNC: 141 MMOL/L (ref 136–145)
TRIGL SERPL-MCNC: 110 MG/DL (ref 0–150)
VLDLC SERPL-MCNC: 20 MG/DL (ref 5–40)

## 2021-05-26 PROCEDURE — G0103 PSA SCREENING: HCPCS

## 2021-05-26 PROCEDURE — 80061 LIPID PANEL: CPT

## 2021-05-26 PROCEDURE — 36415 COLL VENOUS BLD VENIPUNCTURE: CPT

## 2021-05-26 PROCEDURE — 80053 COMPREHEN METABOLIC PANEL: CPT

## 2021-06-04 ENCOUNTER — TELEPHONE (OUTPATIENT)
Dept: FAMILY MEDICINE CLINIC | Facility: CLINIC | Age: 61
End: 2021-06-04

## 2021-06-04 NOTE — TELEPHONE ENCOUNTER
Caller: Olivier White    Relationship to patient: Self    Best call back number: [phone]    Patient is needing: PATIENT WAS SCHEDULED FOR PHYSICAL WITH KOTA LANDAVERDE FOR 06/09/21 DUE TO NEEDING A PHYSICAL BEFORE 06/11/21

## 2021-06-07 ENCOUNTER — TELEPHONE (OUTPATIENT)
Dept: FAMILY MEDICINE CLINIC | Facility: CLINIC | Age: 61
End: 2021-06-07

## 2021-06-07 NOTE — TELEPHONE ENCOUNTER
Caller: Olivier White    Relationship: Self    Best call back number: [phone]    What test was performed: BLOOD WORK     When was the test performed: A FEW WEEKS AGO     Where was the test performed: [facility]    Additional notes:     MR. REIS WOULD LIKE A CALL BACK WITH RESULTS

## 2021-06-23 ENCOUNTER — LAB (OUTPATIENT)
Dept: LAB | Facility: HOSPITAL | Age: 61
End: 2021-06-23

## 2021-06-23 ENCOUNTER — TRANSCRIBE ORDERS (OUTPATIENT)
Dept: ADMINISTRATIVE | Facility: HOSPITAL | Age: 61
End: 2021-06-23

## 2021-06-23 DIAGNOSIS — H35.371 RIGHT EPIRETINAL MEMBRANE: ICD-10-CM

## 2021-06-23 DIAGNOSIS — H35.371 RIGHT EPIRETINAL MEMBRANE: Primary | ICD-10-CM

## 2021-06-23 PROCEDURE — 80048 BASIC METABOLIC PNL TOTAL CA: CPT

## 2021-06-23 PROCEDURE — 36415 COLL VENOUS BLD VENIPUNCTURE: CPT

## 2021-06-24 DIAGNOSIS — I10 ESSENTIAL HYPERTENSION: ICD-10-CM

## 2021-06-24 LAB
ANION GAP SERPL CALCULATED.3IONS-SCNC: 11.8 MMOL/L (ref 5–15)
BUN SERPL-MCNC: 17 MG/DL (ref 8–23)
BUN/CREAT SERPL: 17.7 (ref 7–25)
CALCIUM SPEC-SCNC: 8.8 MG/DL (ref 8.6–10.5)
CHLORIDE SERPL-SCNC: 104 MMOL/L (ref 98–107)
CO2 SERPL-SCNC: 24.2 MMOL/L (ref 22–29)
CREAT SERPL-MCNC: 0.96 MG/DL (ref 0.76–1.27)
GFR SERPL CREATININE-BSD FRML MDRD: 79 ML/MIN/1.73
GLUCOSE SERPL-MCNC: 80 MG/DL (ref 65–99)
POTASSIUM SERPL-SCNC: 4.1 MMOL/L (ref 3.5–5.2)
SODIUM SERPL-SCNC: 140 MMOL/L (ref 136–145)

## 2021-06-24 RX ORDER — ATENOLOL 50 MG/1
50 TABLET ORAL 2 TIMES DAILY
Qty: 60 TABLET | Refills: 1 | Status: SHIPPED | OUTPATIENT
Start: 2021-06-24 | End: 2021-08-25 | Stop reason: SDUPTHER

## 2021-06-24 RX ORDER — LOSARTAN POTASSIUM 25 MG/1
25 TABLET ORAL DAILY
Qty: 30 TABLET | Refills: 1 | Status: SHIPPED | OUTPATIENT
Start: 2021-06-24 | End: 2021-08-25 | Stop reason: SDUPTHER

## 2021-06-24 NOTE — TELEPHONE ENCOUNTER
Caller: RILEY BURNETTE    Relationship: Emergency Contact    Best call back number: [phone]    Medication needed:   Requested Prescriptions     Pending Prescriptions Disp Refills   • atenolol (TENORMIN) 50 MG tablet 180 tablet 3     Sig: Take 1 tablet by mouth 2 (Two) Times a Day.   • losartan (COZAAR) 25 MG tablet 90 tablet 3     Sig: Take 1 tablet by mouth Daily.       When do you need the refill by: 06/26/21    What additional details did the patient provide when requesting the medication: PATIENT WILL BE OUT OVER THE WEEKEND     PATIENT WOULD LIKE A 90 DAY PRESCRIPTION AND FUTURE REFILLS WILL NEED TO GO TO THIS PHARMACY AS WELL    Does the patient have less than a 3 day supply:  [x] Yes  [] No    What is the patient's preferred pharmacy: [facility] #[id number] - [place], [place] - [address]  - [phone]  - [phone] FX

## 2021-06-24 NOTE — TELEPHONE ENCOUNTER
He needs to be seen yearly- I sent in Rx for 2 months worth to give time to make appt- we can not continue these meds unless he is seen yearly

## 2021-06-24 NOTE — TELEPHONE ENCOUNTER
SPOKE TO RILEY AND SHE INSISTED THAT EVEN THOUGH PATIENT HAS NOT BEEN HERE SINCE 2019 THAT HE DOES NOT NEED AN APPOINTMENT SCHEDULED BECAUSE HE DID LABS IN MAY. I TRIED TO EXPLAIN THAT HE STILL NEEDS TO BE SEEN AND THAT WE CAN REFILL THE MEDS IF WE SCHEDULE HIM TO COME IN AUGUST BUT SHE REFUSED TO SCHEDULE ANYTHING.

## 2021-06-24 NOTE — TELEPHONE ENCOUNTER
He has not seen [facility] since 2019    He does how ever see , Cardio as well   LOV 03/11/2021        He needs appt with PCP  ([facility])

## 2021-06-25 NOTE — TELEPHONE ENCOUNTER
Caller: RILEY BURNETTE    Relationship to patient: Emergency Contact    Best call back number: [phone]     Patient is needing:     MR. BURNETTE SAYS THAT [facility] IS NEEDING OKAY FROM OFFICE TO START REFILLING HIS MEDICATION THERE , SINCE HE IS NO LONGER USING [facility] DUE INSURANCE CHANGE     3 TO 4 DAYS OF MEDICATION REMAINING   atenolol (TENORMIN) 50 MG tablet      losartan (COZAAR) 25 MG tablet

## 2021-08-15 DIAGNOSIS — I10 ESSENTIAL HYPERTENSION: ICD-10-CM

## 2021-08-16 RX ORDER — ATENOLOL 50 MG/1
TABLET ORAL
Qty: 60 TABLET | Refills: 1 | OUTPATIENT
Start: 2021-08-16

## 2021-08-16 RX ORDER — LOSARTAN POTASSIUM 25 MG/1
25 TABLET ORAL DAILY
Qty: 30 TABLET | Refills: 1 | OUTPATIENT
Start: 2021-08-16

## 2021-08-16 NOTE — TELEPHONE ENCOUNTER
I TRIED TO GET THIS PATIENT TO SCHEDULE IN June AND HE SAID THE SAME THING THEN THAT HE SAID JUST NOW. THAT EVEN THOUGH HE HAS NOT BEEN SEEN SINCE December 2019 HE DID HAVE LABS IN MAY 2021 AND WE SHOULD BE ABLE TO KEEP PRESCRIBING HIS MEDS BASED OFF THAT AND STOP DEMANDING THAT HE MAKE AN APPT.

## 2021-08-17 DIAGNOSIS — I10 ESSENTIAL HYPERTENSION: ICD-10-CM

## 2021-08-17 RX ORDER — ATENOLOL 50 MG/1
50 TABLET ORAL 2 TIMES DAILY
Qty: 60 TABLET | Refills: 1 | OUTPATIENT
Start: 2021-08-17

## 2021-08-17 RX ORDER — LOSARTAN POTASSIUM 25 MG/1
25 TABLET ORAL DAILY
Qty: 30 TABLET | Refills: 1 | OUTPATIENT
Start: 2021-08-17

## 2021-08-17 NOTE — TELEPHONE ENCOUNTER
Please let Mr. White noted I am not okay practicing medicine in this way.  If he does not want to be seen, perhaps he can work this arrangement out with another provider.  However, I am not comfortable with this amount of liability.  I am comfortable providing enough medication to get through to 2 years from his last appointment but not further.  He can be seen within this time period or find a new provider

## 2021-08-17 NOTE — TELEPHONE ENCOUNTER
Caller: Olivier White    Relationship: Self    Best call back number: [phone]    Medication needed:   Requested Prescriptions     Pending Prescriptions Disp Refills   • losartan (COZAAR) 25 MG tablet 30 tablet 1     Sig: Take 1 tablet by mouth Daily.   • atenolol (TENORMIN) 50 MG tablet 60 tablet 1     Sig: Take 1 tablet by mouth 2 (Two) Times a Day.       When do you need the refill by: TODAY    What additional details did the patient provide when requesting the medication:     MR. WHITE SAYS HE IS NOT NEEDING APPOINTMENT FOR REFILL . HE HAS 2 DAYS OF MEDICATION REMAINING    Does the patient have less than a 3 day supply:  [x] Yes  [] No    What is the patient's preferred pharmacy: [facility] #[id number] - [place], [place] - [address] - [phone]  - [phone] FX

## 2021-08-17 NOTE — TELEPHONE ENCOUNTER
He has not been seen since 2019. He has cancelled appts that has been made.     He came in for labs and thinks that should be enough. He refuses to make appt now so that we can refill.  Read other phone notes.     This makes 3rd time of us asking to schedule an appt      Was seen 12/4/2019  office visit before that was 2018 [facility]

## 2021-08-20 NOTE — TELEPHONE ENCOUNTER
PATIENT CALLED BACK TO CHECK ON HIS REFILL AND I GAVE HIM DR SAUL MESSAGE. HE SAID IT IS RIDICULOUS OF HIM TO SAY THAT BECAUSE WE CANCELED A COUPLE OF APPTS ON HIM IN MARCH AND MAY. AND I SAID YES BUT THE ISSUE IS [facility] TRIED TO GET YOU TO SCHEDULE MULTIPLE TIMES SINCE THEN AND YOU REFUSE. HE NOW SAYS HE HAS NO INSURANCE AND CANNOT COME IN AND NEEDS HIS MEDS CALLED IN ONLY. HE WANTS THIS DONE BY THE END OF THE DAY. HE HAD LABS HERE 5/21 AND LAST VISIT WAS 12/19.

## 2021-08-25 ENCOUNTER — OFFICE VISIT (OUTPATIENT)
Dept: FAMILY MEDICINE CLINIC | Facility: CLINIC | Age: 61
End: 2021-08-25

## 2021-08-25 VITALS
OXYGEN SATURATION: 98 % | SYSTOLIC BLOOD PRESSURE: 144 MMHG | DIASTOLIC BLOOD PRESSURE: 86 MMHG | RESPIRATION RATE: 16 BRPM | HEIGHT: 78 IN | BODY MASS INDEX: 33.02 KG/M2 | WEIGHT: 285.38 LBS | HEART RATE: 74 BPM

## 2021-08-25 DIAGNOSIS — I10 ESSENTIAL HYPERTENSION: Primary | ICD-10-CM

## 2021-08-25 PROCEDURE — 99213 OFFICE O/P EST LOW 20 MIN: CPT | Performed by: NURSE PRACTITIONER

## 2021-08-25 RX ORDER — ATENOLOL 50 MG/1
50 TABLET ORAL 2 TIMES DAILY
Qty: 180 TABLET | Refills: 3 | Status: SHIPPED | OUTPATIENT
Start: 2021-08-25 | End: 2022-08-26 | Stop reason: SDUPTHER

## 2021-08-25 RX ORDER — LOSARTAN POTASSIUM 25 MG/1
25 TABLET ORAL DAILY
Qty: 90 TABLET | Refills: 3 | Status: SHIPPED | OUTPATIENT
Start: 2021-08-25 | End: 2022-08-26 | Stop reason: SDUPTHER

## 2021-08-25 NOTE — PROGRESS NOTES
"Chief Complaint  Hypertension (med check)    Subjective          Olivier White presents to [facility]  History of Present Illness  Here today for follow up HTN  Is taking hi BP med as prescribed  Initial bp today 144/86, repeat /84   Does check blood pressure at home, usually around 120-130s/70-80s  Denies any c/o chest pain, soa, ha, dizziness, edema or medication side effects    Review of Systems   Constitutional: Negative.    HENT: Negative.    Respiratory: Negative.    Cardiovascular: Negative.    Gastrointestinal: Negative.    Genitourinary: Negative.    Musculoskeletal: Negative.    Neurological: Negative.        Objective   Vital Signs:   /86 (BP Location: Left arm, Patient Position: Sitting, Cuff Size: Adult)   Pulse 74   Resp 16   Ht 200.7 cm (79\")   Wt 129 kg (285 lb 6 oz)   SpO2 98%   BMI 32.15 kg/m²     Physical Exam  Vitals reviewed.   Constitutional:       Appearance: Normal appearance. He is well-developed and well-groomed.   HENT:      Head: Normocephalic.   Cardiovascular:      Rate and Rhythm: Normal rate.      Heart sounds: Normal heart sounds.   Pulmonary:      Effort: Pulmonary effort is normal.      Breath sounds: Normal breath sounds.   Musculoskeletal:         General: Normal range of motion.      Cervical back: Normal range of motion.      Right lower leg: No edema.      Left lower leg: No edema.   Skin:     General: Skin is warm.      Capillary Refill: Capillary refill takes less than 2 seconds.   Neurological:      Mental Status: He is alert and oriented to person, place, and time.   Psychiatric:         Mood and Affect: Mood normal.        Result Review :                 Assessment and Plan    Diagnoses and all orders for this visit:    1. Essential hypertension (Primary)  -     atenolol (TENORMIN) 50 MG tablet; Take 1 tablet by mouth 2 (Two) Times a Day.  Dispense: 180 tablet; Refill: 3  -     losartan (COZAAR) 25 MG tablet; Take 1 " tablet by mouth Daily.  Dispense: 90 tablet; Refill: 3        Follow Up   Return in about 1 year (around 8/25/2022) for Recheck.  Patient was given instructions and counseling regarding his condition or for health maintenance advice. Please see specific information pulled into the AVS if appropriate.

## 2022-08-26 ENCOUNTER — OFFICE VISIT (OUTPATIENT)
Dept: FAMILY MEDICINE CLINIC | Facility: CLINIC | Age: 62
End: 2022-08-26

## 2022-08-26 ENCOUNTER — LAB (OUTPATIENT)
Dept: FAMILY MEDICINE CLINIC | Facility: CLINIC | Age: 62
End: 2022-08-26

## 2022-08-26 VITALS
DIASTOLIC BLOOD PRESSURE: 80 MMHG | WEIGHT: 285 LBS | HEART RATE: 72 BPM | SYSTOLIC BLOOD PRESSURE: 146 MMHG | BODY MASS INDEX: 32.97 KG/M2 | HEIGHT: 78 IN | OXYGEN SATURATION: 98 % | RESPIRATION RATE: 18 BRPM

## 2022-08-26 DIAGNOSIS — Z00.00 ENCOUNTER FOR ANNUAL PHYSICAL EXAM: Primary | ICD-10-CM

## 2022-08-26 DIAGNOSIS — Z12.5 SCREENING FOR PROSTATE CANCER: ICD-10-CM

## 2022-08-26 DIAGNOSIS — Z00.00 ENCOUNTER FOR ANNUAL PHYSICAL EXAM: ICD-10-CM

## 2022-08-26 DIAGNOSIS — I10 ESSENTIAL HYPERTENSION: ICD-10-CM

## 2022-08-26 LAB
ALBUMIN SERPL-MCNC: 4.2 G/DL (ref 3.5–5.2)
ALBUMIN/GLOB SERPL: 1.7 G/DL
ALP SERPL-CCNC: 52 U/L (ref 39–117)
ALT SERPL W P-5'-P-CCNC: 44 U/L (ref 1–41)
ANION GAP SERPL CALCULATED.3IONS-SCNC: 9 MMOL/L (ref 5–15)
AST SERPL-CCNC: 30 U/L (ref 1–40)
BILIRUB SERPL-MCNC: 1.8 MG/DL (ref 0–1.2)
BILIRUB UR QL STRIP: NEGATIVE
BUN SERPL-MCNC: 15 MG/DL (ref 8–23)
BUN/CREAT SERPL: 14.9 (ref 7–25)
CALCIUM SPEC-SCNC: 9.4 MG/DL (ref 8.6–10.5)
CHLORIDE SERPL-SCNC: 102 MMOL/L (ref 98–107)
CHOLEST SERPL-MCNC: 190 MG/DL (ref 0–200)
CLARITY UR: CLEAR
CO2 SERPL-SCNC: 29 MMOL/L (ref 22–29)
COLOR UR: YELLOW
CREAT SERPL-MCNC: 1.01 MG/DL (ref 0.76–1.27)
DEPRECATED RDW RBC AUTO: 44.3 FL (ref 37–54)
EGFRCR SERPLBLD CKD-EPI 2021: 83 ML/MIN/1.73
ERYTHROCYTE [DISTWIDTH] IN BLOOD BY AUTOMATED COUNT: 13 % (ref 12.3–15.4)
GLOBULIN UR ELPH-MCNC: 2.5 GM/DL
GLUCOSE SERPL-MCNC: 99 MG/DL (ref 65–99)
GLUCOSE UR STRIP-MCNC: NEGATIVE MG/DL
HCT VFR BLD AUTO: 48.3 % (ref 37.5–51)
HDLC SERPL-MCNC: 41 MG/DL (ref 40–60)
HGB BLD-MCNC: 16.3 G/DL (ref 13–17.7)
HGB UR QL STRIP.AUTO: NEGATIVE
KETONES UR QL STRIP: NEGATIVE
LDLC SERPL CALC-MCNC: 128 MG/DL (ref 0–100)
LDLC/HDLC SERPL: 3.07 {RATIO}
LEUKOCYTE ESTERASE UR QL STRIP.AUTO: NEGATIVE
MCH RBC QN AUTO: 31.5 PG (ref 26.6–33)
MCHC RBC AUTO-ENTMCNC: 33.7 G/DL (ref 31.5–35.7)
MCV RBC AUTO: 93.2 FL (ref 79–97)
NITRITE UR QL STRIP: NEGATIVE
PH UR STRIP.AUTO: 5.5 [PH] (ref 5–8)
PLATELET # BLD AUTO: 201 10*3/MM3 (ref 140–450)
PMV BLD AUTO: 9.4 FL (ref 6–12)
POTASSIUM SERPL-SCNC: 4.4 MMOL/L (ref 3.5–5.2)
PROT SERPL-MCNC: 6.7 G/DL (ref 6–8.5)
PROT UR QL STRIP: NEGATIVE
PSA SERPL-MCNC: 1.98 NG/ML (ref 0–4)
RBC # BLD AUTO: 5.18 10*6/MM3 (ref 4.14–5.8)
SODIUM SERPL-SCNC: 140 MMOL/L (ref 136–145)
SP GR UR STRIP: 1.02 (ref 1–1.03)
TRIGL SERPL-MCNC: 116 MG/DL (ref 0–150)
UROBILINOGEN UR QL STRIP: NORMAL
VLDLC SERPL-MCNC: 21 MG/DL (ref 5–40)
WBC NRBC COR # BLD: 7.2 10*3/MM3 (ref 3.4–10.8)

## 2022-08-26 PROCEDURE — 36415 COLL VENOUS BLD VENIPUNCTURE: CPT

## 2022-08-26 PROCEDURE — G0103 PSA SCREENING: HCPCS | Performed by: NURSE PRACTITIONER

## 2022-08-26 PROCEDURE — 81003 URINALYSIS AUTO W/O SCOPE: CPT | Performed by: NURSE PRACTITIONER

## 2022-08-26 PROCEDURE — 99396 PREV VISIT EST AGE 40-64: CPT | Performed by: NURSE PRACTITIONER

## 2022-08-26 PROCEDURE — 80061 LIPID PANEL: CPT | Performed by: NURSE PRACTITIONER

## 2022-08-26 PROCEDURE — 80053 COMPREHEN METABOLIC PANEL: CPT | Performed by: NURSE PRACTITIONER

## 2022-08-26 PROCEDURE — 85027 COMPLETE CBC AUTOMATED: CPT | Performed by: NURSE PRACTITIONER

## 2022-08-26 RX ORDER — LOSARTAN POTASSIUM 25 MG/1
25 TABLET ORAL DAILY
Qty: 90 TABLET | Refills: 3 | Status: SHIPPED | OUTPATIENT
Start: 2022-08-26 | End: 2023-03-24

## 2022-08-26 RX ORDER — ATENOLOL 50 MG/1
50 TABLET ORAL 2 TIMES DAILY
Qty: 180 TABLET | Refills: 3 | Status: SHIPPED | OUTPATIENT
Start: 2022-08-26

## 2022-08-26 NOTE — PROGRESS NOTES
"Chief Complaint  Annual Exam    Subjective          Olivier White presents to [facility]  History of Present Illness    Is here today for annual physical  Has h/o HTN  Current medicines are atenolol 50mg, and losartan 25mg  His BP today is 146/80 initially, repeat is 140/92    He is not checking it at home with any regularity    He has been taking atenolol only once daily for the past couple of years, started this after losing some weight    He exercises regularly and endorses a healthy, balanced diet    He is fasting for labs    Last did cologuard in 2020, is due again in 2023    Review of Systems   Constitutional: Negative.  Negative for activity change, appetite change, fatigue and fever.   Respiratory: Negative.  Negative for cough, shortness of breath and wheezing.    Cardiovascular: Negative.  Negative for chest pain.   Gastrointestinal: Negative for abdominal pain, blood in stool, constipation, diarrhea and nausea.   Genitourinary: Negative for dysuria, frequency and urgency.        Occasional slow to start a urine stream   Musculoskeletal: Negative.  Negative for arthralgias and myalgias.   Neurological: Negative.  Negative for dizziness, weakness and headaches. Speech difficulty:      Psychiatric/Behavioral: Negative for dysphoric mood and sleep disturbance. The patient is not nervous/anxious.         Has sleep apnea, uses a cpap machine     Objective   Vital Signs:  /80 (BP Location: Right arm, Patient Position: Sitting)   Pulse 72   Resp 18   Ht 200.7 cm (79\")   Wt 129 kg (285 lb)   SpO2 98%   BMI 32.11 kg/m²     BP Readings from Last 3 Encounters:   08/26/22 146/80   08/25/21 144/86   03/11/21 130/84        Wt Readings from Last 3 Encounters:   08/26/22 129 kg (285 lb)   08/25/21 129 kg (285 lb 6 oz)   03/11/21 128 kg (282 lb)        BMI is >= 30 and <35. (Class 1 Obesity). The following options were offered after discussion;: weight loss educational " material (shared in after visit summary), exercise counseling/recommendations and nutrition counseling/recommendations      Physical Exam  Vitals reviewed.   Constitutional:       Appearance: Normal appearance.   HENT:      Right Ear: Tympanic membrane and ear canal normal.      Left Ear: Tympanic membrane and ear canal normal.      Nose: Nose normal.      Mouth/Throat:      Mouth: Mucous membranes are moist.      Pharynx: Oropharynx is clear.   Eyes:      Extraocular Movements: Extraocular movements intact.   Neck:      Vascular: No carotid bruit.   Cardiovascular:      Rate and Rhythm: Normal rate and regular rhythm.      Pulses: Normal pulses.      Heart sounds: Normal heart sounds.   Pulmonary:      Effort: Pulmonary effort is normal.      Breath sounds: Normal breath sounds.   Abdominal:      General: Bowel sounds are normal.      Palpations: Abdomen is soft.      Tenderness: There is no abdominal tenderness. There is no right CVA tenderness or left CVA tenderness.   Musculoskeletal:         General: Normal range of motion.      Cervical back: Neck supple. No tenderness.      Right lower leg: No edema.      Left lower leg: No edema.   Lymphadenopathy:      Cervical: No cervical adenopathy.   Skin:     General: Skin is warm.   Neurological:      Mental Status: He is alert and oriented to person, place, and time.   Psychiatric:         Mood and Affect: Mood normal.        Result Review :                 Assessment and Plan    Diagnoses and all orders for this visit:    1. Encounter for annual physical exam (Primary)  -     Comprehensive Metabolic Panel; Future  -     CBC (No Diff); Future  -     Urinalysis With Culture If Indicated - Urine, Clean Catch; Future    2. Essential hypertension  -     Lipid Panel; Future  -     losartan (COZAAR) 25 MG tablet; Take 1 tablet by mouth Daily.  Dispense: 90 tablet; Refill: 3  -     atenolol (TENORMIN) 50 MG tablet; Take 1 tablet by mouth 2 (Two) Times a Day.  Dispense: 180  tablet; Refill: 3    3. Screening for prostate cancer  -     PSA Screen; Future    check fasting labs  resume atenolol at bid, monitor BP, if doesn't come down plan increase increase losartan to 50mg qd         Follow Up   Return in about 1 year (around 8/26/2023) for Annual physical.  Patient was given instructions and counseling regarding his condition or for health maintenance advice. Please see specific information pulled into the AVS if appropriate.

## 2023-03-24 ENCOUNTER — OFFICE VISIT (OUTPATIENT)
Dept: FAMILY MEDICINE CLINIC | Facility: CLINIC | Age: 63
End: 2023-03-24
Payer: MEDICARE

## 2023-03-24 VITALS
RESPIRATION RATE: 18 BRPM | OXYGEN SATURATION: 97 % | WEIGHT: 292 LBS | DIASTOLIC BLOOD PRESSURE: 90 MMHG | BODY MASS INDEX: 32.9 KG/M2 | SYSTOLIC BLOOD PRESSURE: 140 MMHG | HEART RATE: 47 BPM

## 2023-03-24 DIAGNOSIS — I10 HYPERTENSION, UNSPECIFIED TYPE: Primary | ICD-10-CM

## 2023-03-24 DIAGNOSIS — Z12.11 SCREENING FOR COLON CANCER: ICD-10-CM

## 2023-03-24 PROCEDURE — 3080F DIAST BP >= 90 MM HG: CPT | Performed by: NURSE PRACTITIONER

## 2023-03-24 PROCEDURE — 1160F RVW MEDS BY RX/DR IN RCRD: CPT | Performed by: NURSE PRACTITIONER

## 2023-03-24 PROCEDURE — 3077F SYST BP >= 140 MM HG: CPT | Performed by: NURSE PRACTITIONER

## 2023-03-24 PROCEDURE — 99213 OFFICE O/P EST LOW 20 MIN: CPT | Performed by: NURSE PRACTITIONER

## 2023-03-24 PROCEDURE — 1159F MED LIST DOCD IN RCRD: CPT | Performed by: NURSE PRACTITIONER

## 2023-03-24 RX ORDER — LOSARTAN POTASSIUM 50 MG/1
50 TABLET ORAL DAILY
Qty: 30 TABLET | Refills: 1 | Status: SHIPPED | OUTPATIENT
Start: 2023-03-24

## 2023-03-24 NOTE — PROGRESS NOTES
Chief Complaint  Hypertension    Subjective          Olivier White presents to [facility]  History of Present Illness    Is here today for 6 month follow up for HTN    His BP is managed with atenolol 50mg bid and losartan 25mg qd    His BP today is 146/86  Repeat is     At home he checks and gets    He does exercise and is active, endorses that he is more active in the warmer months   he does endorse a healthy diet    Review of Systems   Constitutional: Negative.  Negative for activity change, fatigue and fever.   Respiratory: Negative.  Negative for shortness of breath.    Cardiovascular: Negative.  Negative for chest pain.   Neurological: Negative.  Negative for dizziness, weakness and headaches.   Psychiatric/Behavioral: Negative for sleep disturbance.        H/o sleep apnea, uses  cpap     Objective   Vital Signs:  /90 (BP Location: Left arm, Patient Position: Sitting)   Pulse (!) 47   Resp 18   Wt 132 kg (292 lb)   SpO2 97%   BMI 32.90 kg/m²     BP Readings from Last 3 Encounters:   03/24/23 140/90   08/26/22 146/80   08/25/21 144/86        Wt Readings from Last 3 Encounters:   03/24/23 132 kg (292 lb)   08/26/22 129 kg (285 lb)   08/25/21 129 kg (285 lb 6 oz)              Physical Exam  Vitals reviewed.   Constitutional:       Appearance: Normal appearance.   Neck:      Vascular: No carotid bruit.   Cardiovascular:      Rate and Rhythm: Normal rate and regular rhythm.      Pulses: Normal pulses.      Heart sounds: Normal heart sounds.   Pulmonary:      Effort: Pulmonary effort is normal.      Breath sounds: Normal breath sounds.   Musculoskeletal:      Cervical back: Neck supple.      Right lower leg: No edema.      Left lower leg: No edema.   Skin:     General: Skin is warm.   Neurological:      Mental Status: He is alert and oriented to person, place, and time.   Psychiatric:         Mood and Affect: Mood normal.        Result Review :                  Assessment and Plan    Diagnoses and all orders for this visit:    1. Hypertension, unspecified type (Primary)  -     losartan (Cozaar) 50 MG tablet; Take 1 tablet by mouth Daily.  Dispense: 30 tablet; Refill: 1    2. Screening for colon cancer  -     Cologuard - Stool, Per Rectum; Future    increase losartan from 25mg to 50mg, continue current dose of tenormin (is taking 100mg once daily)       Follow Up   Return in about 4 weeks (around 4/21/2023) for Recheck BP.  Patient was given instructions and counseling regarding his condition or for health maintenance advice. Please see specific information pulled into the AVS if appropriate.

## 2023-03-30 NOTE — TELEPHONE ENCOUNTER
Counseling and Referral of Other Preventative  (Italic type indicates deductible and co-insurance are waived)    Patient Name: Diego Gunter  Today's Date: 3/30/2023    Health Maintenance       Date Due Completion Date    COVID-19 Vaccine (4 - Booster for Pfizer series) 12/31/2021 11/5/2021    Lipid Panel 03/08/2024 3/8/2023    Hemoglobin A1c 03/08/2024 3/8/2023    TETANUS VACCINE 03/08/2025 3/8/2015        Orders Placed This Encounter   Procedures    Ambulatory referral/consult to the [facility]       The following information is provided to all patients.  This information is to help you find resources for any of the problems found today that may be affecting your health:                Living healthy guide: www.[facility].[place].gov      Understanding Diabetes: www.diabetes.org      Eating healthy: www.cdc.gov/healthyweight      [facility] home safety checklist: www.cdc.gov/steadi/patient.html      Agency on Aging: www.goea.[place].[facility]      Alcoholics anonymous (AA): www.aa.org      Physical Activity: www.wing.nih.gov/ny2fwht      Tobacco use: www.quitwithusla.org      PATIENT'S WIFE, KOBI BURNETTE, CALLED STATING THE THE PATIENT, REY BURNETTE, HAS BEEN EXPERIENCING SOME VERTIGO SINCE MONDAY (3/23/20) THAT HAS PROGRESSIVELY GOTTEN WORSE. THE PATIENT'S WIFE STATED THAT THE PATIENT HAS BEEN DIZZY WHEN STANDING UP AND THE ROOM APPEARS TO BE SPINNING WHEN HE LAYS DOWN.    THE PATIENT'S WIFE STATED THAT THE PATIENT WENT TO THE [facility] AT [facility] ON [facility] FOR THE SAME ISSUE BACK IN SEPTEMBER 2019 (9/4/19) AND HE SAW [place] [facility]. THE PATIENT'S WIFE STATED THAT DENIZ KENDRICK PRESCRIBED HIM THE MECLIZINE 25 MG TABLET TO TREAT HIS SYMPTOMS AND IT REALLY HELPED TO ALLEVIATE HIS SYMPTOMS. THE PATIENT'S WIFE STATED THAT HE WAS PRESCRIBED 21 TABLETS AND DIRECTED TO TAKE 1 TABLET BY MOUTH 3 TIMES DAILY FOR 7 DAYS. THE PATIENT'S WIFE STATED THAT HE TOOK ALL BUT 1 OF THE TABLETS, SO HE HAS 1 REMAINING.    THE PATIENT'S WIFE STATED THAT SHE REACHED OUT TO DENIZ KENDRICK AT THE [facility] TO SEE IF SHE COULD WRITE THE PATIENT A NEW PRESCRIPTION FOR THIS MEDICATION BECAUSE HIS CURRENT PRESCRIPTION HAS NO REFILLS REMAINING, BUT SHE ADVISED HER THAT SHE COULD NOT BECAUSE IT HAD BEEN TOO LONG SINCE SHE HAD SEEN HIM. THE PATIENT'S WIFE STATED THAT DENIZ KENDRICK ADVISED HER TO REACH OUT TO THE PATIENT'S PRIMARY CARE PROVIDER (DR. SAUL) FOR THIS PRESCRIPTION REQUEST.    THE PATIENT'S WIFE REQUESTED FOR DR. SAUL TO WRITE THE PATIENT A PRESCRIPTION FOR THE MECLIZINE 25 MG TABLET TO TREAT THE VERTIGO SYMPTOMS THAT HE IS CURRENTLY EXPERIENCING.    THE PATIENT'S WIFE STATED THAT THE PATIENT DOES NOT HAVE A FEVER AND HIS BLOOD PRESSURE IS NORMAL.    I CONFIRMED THE CORRECT PHARMACY WITH THE PATIENT'S WIFE TO BE [facility] (PHONE NUMBER: [phone]).    PLEASE CALL THE PATIENT'S WIFE [phone] OR THE PATIENT [phone] WHEN THIS MESSAGE HAS BEEN RECEIVED AND ADVISE REGARDING THIS PRESCRIPTION REQUEST.

## 2023-05-22 DIAGNOSIS — I10 HYPERTENSION, UNSPECIFIED TYPE: ICD-10-CM

## 2023-05-22 RX ORDER — LOSARTAN POTASSIUM 50 MG/1
TABLET ORAL
Qty: 30 TABLET | Refills: 1 | Status: SHIPPED | OUTPATIENT
Start: 2023-05-22

## 2023-05-28 DIAGNOSIS — I10 ESSENTIAL HYPERTENSION: ICD-10-CM

## 2023-05-30 RX ORDER — ATENOLOL 50 MG/1
TABLET ORAL
Qty: 60 TABLET | Refills: 1 | Status: SHIPPED | OUTPATIENT
Start: 2023-05-30

## 2023-06-06 DIAGNOSIS — I10 HYPERTENSION, UNSPECIFIED TYPE: ICD-10-CM

## 2023-06-06 RX ORDER — LOSARTAN POTASSIUM 50 MG/1
50 TABLET ORAL DAILY
Qty: 30 TABLET | Refills: 1 | Status: SHIPPED | OUTPATIENT
Start: 2023-06-06

## 2023-06-06 NOTE — TELEPHONE ENCOUNTER
Caller: Olivier White    Relationship: Self    Best call back [phone]     Requested Prescriptions:   Requested Prescriptions     Pending Prescriptions Disp Refills    losartan (COZAAR) 50 MG tablet 30 tablet 1     Sig: Take 1 tablet by mouth Daily.        Pharmacy where request should be sent: [facility] [id number] - [place], [place] - [address] - [phone]  - [phone] FX     Last office visit with prescribing clinician: 3/24/2023   Last telemedicine visit with prescribing clinician: Visit date not found   Next office visit with prescribing clinician: 8/28/2023         Does the patient have less than a 3 day supply:  [] Yes  [x] No    Would you like a call back once the refill request has been completed: [x] Yes [] No    If the office needs to give you a call back, can they leave a voicemail: [x] Yes [] No      IF UNABLE TO REFILL PLEASE CALL PATIENT TO ADVISE AND DISCSS PLAN      Martha Her   06/06/23 13:00 EDT

## 2023-06-19 DIAGNOSIS — I10 HYPERTENSION, UNSPECIFIED TYPE: ICD-10-CM

## 2023-06-19 DIAGNOSIS — I10 ESSENTIAL HYPERTENSION: ICD-10-CM

## 2023-06-19 RX ORDER — LOSARTAN POTASSIUM 50 MG/1
50 TABLET ORAL DAILY
Qty: 90 TABLET | Refills: 3 | Status: SHIPPED | OUTPATIENT
Start: 2023-06-19

## 2023-06-19 RX ORDER — ATENOLOL 50 MG/1
50 TABLET ORAL 2 TIMES DAILY
Qty: 180 TABLET | Refills: 1 | Status: SHIPPED | OUTPATIENT
Start: 2023-06-19

## 2023-10-25 ENCOUNTER — OFFICE VISIT (OUTPATIENT)
Dept: FAMILY MEDICINE CLINIC | Facility: CLINIC | Age: 63
End: 2023-10-25
Payer: MEDICARE

## 2023-10-25 VITALS
DIASTOLIC BLOOD PRESSURE: 84 MMHG | OXYGEN SATURATION: 100 % | HEART RATE: 80 BPM | BODY MASS INDEX: 33.78 KG/M2 | RESPIRATION RATE: 18 BRPM | WEIGHT: 292 LBS | HEIGHT: 78 IN | SYSTOLIC BLOOD PRESSURE: 138 MMHG

## 2023-10-25 DIAGNOSIS — Z00.00 ENCOUNTER FOR ANNUAL PHYSICAL EXAM: Primary | ICD-10-CM

## 2023-10-25 DIAGNOSIS — I10 ESSENTIAL HYPERTENSION: ICD-10-CM

## 2023-10-25 DIAGNOSIS — Z12.5 SCREENING PSA (PROSTATE SPECIFIC ANTIGEN): ICD-10-CM

## 2023-10-25 PROBLEM — B37.42 CANDIDAL BALANITIS: Status: RESOLVED | Noted: 2018-05-31 | Resolved: 2023-10-25

## 2023-10-25 RX ORDER — ATENOLOL 50 MG/1
50 TABLET ORAL 2 TIMES DAILY
Qty: 180 TABLET | Refills: 3 | Status: SHIPPED | OUTPATIENT
Start: 2023-10-25

## 2023-10-25 RX ORDER — TAMSULOSIN HYDROCHLORIDE 0.4 MG/1
CAPSULE ORAL
COMMUNITY
Start: 2023-07-26

## 2023-10-25 NOTE — PROGRESS NOTES
"Chief Complaint  Annual Exam    Subjective          Olivier White presents to [facility]  History of Present Illness    Is here today for annual physical, he has been seeing the urologist for an intermittent right testicle \"ache\" which they have not been able to identify a source for      Has h/o HTN, lipoma, oa, gout, sleep apnea  Current medicines are atenolol 50mg, losartan 50mg    BP today is 140/80  Checks at home, has been running 120s-130s/70s-80    Diet is reported to be healthy    Exercises regularly, walks    Colon cancer screening was done by sergio 2023, negative    Review of Systems   Constitutional: Negative.  Negative for appetite change, fatigue and fever.   Respiratory: Negative.  Negative for shortness of breath.    Cardiovascular: Negative.  Negative for chest pain and palpitations.   Gastrointestinal: Negative.  Negative for anal bleeding, constipation and diarrhea.   Genitourinary: Negative.  Negative for dysuria, frequency and urgency.        Had some decreased urine flow, is taking flomax with good results   Musculoskeletal: Negative.  Negative for arthralgias and myalgias.   Allergic/Immunologic: Positive for environmental allergies. Negative for food allergies.        Uses zyrtec daily   Neurological: Negative.  Negative for dizziness, weakness and headaches.   Psychiatric/Behavioral: Negative.  Negative for dysphoric mood and sleep disturbance. The patient is not nervous/anxious.      Objective   Vital Signs:  /84 (BP Location: Left arm, Patient Position: Sitting)   Pulse 80   Resp 18   Ht 200.7 cm (79.02\")   Wt 132 kg (292 lb)   SpO2 100%   BMI 32.88 kg/m²     BP Readings from Last 3 Encounters:   10/25/23 138/84   03/24/23 140/90   08/26/22 146/80        Wt Readings from Last 3 Encounters:   10/25/23 132 kg (292 lb)   03/24/23 132 kg (292 lb)   08/26/22 129 kg (285 lb)        BMI is >= 30 and <35. (Class 1 Obesity). The following " options were offered after discussion;: exercise counseling/recommendations and nutrition counseling/recommendations      Physical Exam  Vitals reviewed.   Constitutional:       Appearance: Normal appearance.   HENT:      Right Ear: Tympanic membrane, ear canal and external ear normal.      Left Ear: Tympanic membrane, ear canal and external ear normal.      Nose: Nose normal.      Mouth/Throat:      Mouth: Mucous membranes are moist.      Pharynx: Oropharynx is clear.   Eyes:      Extraocular Movements: Extraocular movements intact.   Neck:      Thyroid: No thyromegaly.      Vascular: No carotid bruit.   Cardiovascular:      Rate and Rhythm: Normal rate and regular rhythm.      Pulses: Normal pulses.      Heart sounds: Normal heart sounds.   Pulmonary:      Effort: Pulmonary effort is normal.      Breath sounds: Normal breath sounds.   Abdominal:      General: Bowel sounds are normal.      Palpations: Abdomen is soft.      Tenderness: There is no abdominal tenderness. There is no right CVA tenderness or left CVA tenderness.   Musculoskeletal:         General: Normal range of motion.      Cervical back: Neck supple. No tenderness.      Right lower leg: No edema.      Left lower leg: No edema.   Lymphadenopathy:      Cervical: No cervical adenopathy.   Skin:     General: Skin is warm.   Neurological:      Mental Status: He is alert and oriented to person, place, and time.   Psychiatric:         Mood and Affect: Mood normal.        Result Review :                 Assessment and Plan    Diagnoses and all orders for this visit:    1. Encounter for annual physical exam (Primary)  -     Comprehensive Metabolic Panel; Future  -     CBC (No Diff); Future  -     Lipid Panel; Future  -     TSH Rfx On Abnormal To Free T4; Future    2. Essential hypertension  -     Comprehensive Metabolic Panel; Future  -     atenolol (TENORMIN) 50 MG tablet; Take 1 tablet by mouth 2 (Two) Times a Day.  Dispense: 180 tablet; Refill: 3    3.  Screening PSA (prostate specific antigen)  -     PSA Screen; Future           Follow Up   Return in about 6 months (around 4/25/2024) for Recheck BP.  Patient was given instructions and counseling regarding his condition or for health maintenance advice. Please see specific information pulled into the AVS if appropriate.   Body mass index is 32.88 kg/m².

## 2023-10-30 ENCOUNTER — PATIENT ROUNDING (BHMG ONLY) (OUTPATIENT)
Dept: FAMILY MEDICINE CLINIC | Facility: CLINIC | Age: 63
End: 2023-10-30
Payer: COMMERCIAL

## 2023-10-30 NOTE — PROGRESS NOTES
October 30, 2023      A [employer] message was sent to Olivier White inquiring about patient's experience at our office during a recent visit.      CALEB [place]  [facility]  [address]  [place] [address].

## 2023-12-15 ENCOUNTER — LAB (OUTPATIENT)
Dept: FAMILY MEDICINE CLINIC | Facility: CLINIC | Age: 63
End: 2023-12-15
Payer: MEDICARE

## 2023-12-15 DIAGNOSIS — Z00.00 ENCOUNTER FOR ANNUAL PHYSICAL EXAM: ICD-10-CM

## 2023-12-15 DIAGNOSIS — I10 ESSENTIAL HYPERTENSION: ICD-10-CM

## 2023-12-15 DIAGNOSIS — Z12.5 SCREENING PSA (PROSTATE SPECIFIC ANTIGEN): ICD-10-CM

## 2023-12-15 LAB
ALBUMIN SERPL-MCNC: 4 G/DL (ref 3.5–5.2)
ALBUMIN/GLOB SERPL: 1.4 G/DL
ALP SERPL-CCNC: 51 U/L (ref 39–117)
ALT SERPL W P-5'-P-CCNC: 42 U/L (ref 1–41)
ANION GAP SERPL CALCULATED.3IONS-SCNC: 12.7 MMOL/L (ref 5–15)
AST SERPL-CCNC: 32 U/L (ref 1–40)
BILIRUB SERPL-MCNC: 1.9 MG/DL (ref 0–1.2)
BUN SERPL-MCNC: 11 MG/DL (ref 8–23)
BUN/CREAT SERPL: 9.8 (ref 7–25)
CALCIUM SPEC-SCNC: 9.1 MG/DL (ref 8.6–10.5)
CHLORIDE SERPL-SCNC: 107 MMOL/L (ref 98–107)
CHOLEST SERPL-MCNC: 169 MG/DL (ref 0–200)
CO2 SERPL-SCNC: 25.3 MMOL/L (ref 22–29)
CREAT SERPL-MCNC: 1.12 MG/DL (ref 0.76–1.27)
DEPRECATED RDW RBC AUTO: 42.6 FL (ref 37–54)
EGFRCR SERPLBLD CKD-EPI 2021: 72.9 ML/MIN/1.73
ERYTHROCYTE [DISTWIDTH] IN BLOOD BY AUTOMATED COUNT: 12.7 % (ref 12.3–15.4)
GLOBULIN UR ELPH-MCNC: 2.9 GM/DL
GLUCOSE SERPL-MCNC: 99 MG/DL (ref 65–99)
HCT VFR BLD AUTO: 45 % (ref 37.5–51)
HDLC SERPL-MCNC: 40 MG/DL (ref 40–60)
HGB BLD-MCNC: 15.7 G/DL (ref 13–17.7)
LDLC SERPL CALC-MCNC: 112 MG/DL (ref 0–100)
LDLC/HDLC SERPL: 2.76 {RATIO}
MCH RBC QN AUTO: 32.4 PG (ref 26.6–33)
MCHC RBC AUTO-ENTMCNC: 34.9 G/DL (ref 31.5–35.7)
MCV RBC AUTO: 93 FL (ref 79–97)
PLATELET # BLD AUTO: 206 10*3/MM3 (ref 140–450)
PMV BLD AUTO: 9.3 FL (ref 6–12)
POTASSIUM SERPL-SCNC: 4.1 MMOL/L (ref 3.5–5.2)
PROT SERPL-MCNC: 6.9 G/DL (ref 6–8.5)
PSA SERPL-MCNC: 2.32 NG/ML (ref 0–4)
RBC # BLD AUTO: 4.84 10*6/MM3 (ref 4.14–5.8)
SODIUM SERPL-SCNC: 145 MMOL/L (ref 136–145)
TRIGL SERPL-MCNC: 93 MG/DL (ref 0–150)
TSH SERPL DL<=0.05 MIU/L-ACNC: 1.82 UIU/ML (ref 0.27–4.2)
VLDLC SERPL-MCNC: 17 MG/DL (ref 5–40)
WBC NRBC COR # BLD AUTO: 6.4 10*3/MM3 (ref 3.4–10.8)

## 2023-12-15 PROCEDURE — 80053 COMPREHEN METABOLIC PANEL: CPT | Performed by: NURSE PRACTITIONER

## 2023-12-15 PROCEDURE — 80061 LIPID PANEL: CPT | Performed by: NURSE PRACTITIONER

## 2023-12-15 PROCEDURE — 84443 ASSAY THYROID STIM HORMONE: CPT | Performed by: NURSE PRACTITIONER

## 2023-12-15 PROCEDURE — G0103 PSA SCREENING: HCPCS | Performed by: NURSE PRACTITIONER

## 2023-12-15 PROCEDURE — 85027 COMPLETE CBC AUTOMATED: CPT | Performed by: NURSE PRACTITIONER

## 2023-12-15 PROCEDURE — 36415 COLL VENOUS BLD VENIPUNCTURE: CPT

## 2024-01-04 ENCOUNTER — OFFICE VISIT (OUTPATIENT)
Dept: FAMILY MEDICINE CLINIC | Facility: CLINIC | Age: 64
End: 2024-01-04
Payer: MEDICARE

## 2024-01-04 VITALS
OXYGEN SATURATION: 95 % | RESPIRATION RATE: 18 BRPM | SYSTOLIC BLOOD PRESSURE: 126 MMHG | DIASTOLIC BLOOD PRESSURE: 80 MMHG | HEIGHT: 78 IN | HEART RATE: 59 BPM | BODY MASS INDEX: 33.44 KG/M2 | WEIGHT: 289 LBS

## 2024-01-04 DIAGNOSIS — M70.51 BURSITIS OF RIGHT KNEE, UNSPECIFIED BURSA: Primary | ICD-10-CM

## 2024-01-04 PROCEDURE — 1160F RVW MEDS BY RX/DR IN RCRD: CPT | Performed by: NURSE PRACTITIONER

## 2024-01-04 PROCEDURE — 3079F DIAST BP 80-89 MM HG: CPT | Performed by: NURSE PRACTITIONER

## 2024-01-04 PROCEDURE — 99213 OFFICE O/P EST LOW 20 MIN: CPT | Performed by: NURSE PRACTITIONER

## 2024-01-04 PROCEDURE — 1159F MED LIST DOCD IN RCRD: CPT | Performed by: NURSE PRACTITIONER

## 2024-01-04 PROCEDURE — 3074F SYST BP LT 130 MM HG: CPT | Performed by: NURSE PRACTITIONER
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
## 2024-01-09 ENCOUNTER — LAB (OUTPATIENT)
Dept: LAB | Facility: HOSPITAL | Age: 64
End: 2024-01-09
Payer: MEDICARE

## 2024-01-09 ENCOUNTER — OFFICE VISIT (OUTPATIENT)
Dept: ORTHOPEDIC SURGERY | Facility: CLINIC | Age: 64
End: 2024-01-09
Payer: MEDICARE

## 2024-01-09 VITALS — BODY MASS INDEX: 33.44 KG/M2 | OXYGEN SATURATION: 98 % | HEIGHT: 78 IN | RESPIRATION RATE: 20 BRPM | WEIGHT: 289 LBS

## 2024-01-09 DIAGNOSIS — M25.561 ACUTE PAIN OF RIGHT KNEE: Primary | ICD-10-CM

## 2024-01-09 DIAGNOSIS — M70.51 PATELLAR BURSITIS OF RIGHT KNEE: ICD-10-CM

## 2024-01-09 DIAGNOSIS — M70.51 PATELLAR BURSITIS OF RIGHT KNEE: Primary | ICD-10-CM

## 2024-01-09 LAB — CRYSTALS FLD MICRO: NORMAL

## 2024-01-09 PROCEDURE — 87070 CULTURE OTHR SPECIMN AEROBIC: CPT

## 2024-01-09 PROCEDURE — 89060 EXAM SYNOVIAL FLUID CRYSTALS: CPT

## 2024-01-09 PROCEDURE — 87205 SMEAR GRAM STAIN: CPT

## 2024-01-09 RX ORDER — LIDOCAINE HYDROCHLORIDE 10 MG/ML
2 INJECTION, SOLUTION INFILTRATION; PERINEURAL
Status: COMPLETED | OUTPATIENT
Start: 2024-01-09 | End: 2024-01-09

## 2024-01-09 RX ADMIN — LIDOCAINE HYDROCHLORIDE 2 ML: 10 INJECTION, SOLUTION INFILTRATION; PERINEURAL at 09:46
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
## 2024-01-10 ENCOUNTER — PATIENT ROUNDING (BHMG ONLY) (OUTPATIENT)
Dept: ORTHOPEDIC SURGERY | Facility: CLINIC | Age: 64
End: 2024-01-10
Payer: COMMERCIAL

## 2024-01-12 ENCOUNTER — OFFICE VISIT (OUTPATIENT)
Dept: ORTHOPEDIC SURGERY | Facility: CLINIC | Age: 64
End: 2024-01-12
Payer: MEDICARE

## 2024-01-12 VITALS — WEIGHT: 289 LBS | HEIGHT: 78 IN | RESPIRATION RATE: 20 BRPM | OXYGEN SATURATION: 99 % | BODY MASS INDEX: 33.44 KG/M2

## 2024-01-12 DIAGNOSIS — M70.51 PATELLAR BURSITIS OF RIGHT KNEE: Primary | ICD-10-CM

## 2024-01-12 RX ORDER — LIDOCAINE HYDROCHLORIDE 10 MG/ML
1 INJECTION, SOLUTION INFILTRATION; PERINEURAL
Status: COMPLETED | OUTPATIENT
Start: 2024-01-12 | End: 2024-01-12

## 2024-01-12 RX ADMIN — LIDOCAINE HYDROCHLORIDE 1 ML: 10 INJECTION, SOLUTION INFILTRATION; PERINEURAL at 10:03

## 2024-01-12 NOTE — PROGRESS NOTES
FOLLOW UP VISIT        Patient Name: Olivier White  : 1960  Primary Care Physician: Karen Olivas, [place]        Chief Complaint:  knee bursitis    HPI:   Olivier White is a 63 y.o. year old who presents today for follow up of left knee swelling.     Seen 2 days ago with aspiration performed. Fluid re accumulated after 1-2 days and ACE wrap removal.     Previous fluid analysis sent to lab and negative crystals, cultures/gram stain negative.       Past Medical/Surgical, Social and Family History:  I have reviewed and/or updated pertinent history as noted in the medical record including:  Past Medical History:   Diagnosis Date    Anesthesia complication     slow to arouse from full anesthesia    Arthritis     left foot    Gout     Heart murmur     Hypertension 2017    Lipoma 2016    Neck strain     Skin cancer of face     Sleep apnea     does not use machine     Past Surgical History:   Procedure Laterality Date    FOOT SURGERY      Repair snapped tendon. Left ankle/foot.    NECK SURGERY      Growth removal    SKIN CANCER EXCISION      TENDON LENGTHEN/SHORTEN ANKLE/LEG      repair    TOE AMPUTATION      VASECTOMY       Social History     Occupational History    Not on file   Tobacco Use    Smoking status: Former     Packs/day: 0.25     Years: 5.00     Additional pack years: 0.00     Total pack years: 1.25     Types: Cigarettes     Quit date: 1989     Years since quittin.0    Smokeless tobacco: Never    Tobacco comments:     Haven't smoked for 33 yrs.   Vaping Use    Vaping Use: Never used   Substance and Sexual Activity    Alcohol use: Yes     Alcohol/week: 1.0 standard drink of alcohol     Types: 1 Cans of beer per week     Comment: Maybe one a week    Drug use: No    Sexual activity: Yes     Partners: Female     Comment: Vasectomy      Social History     Social History Narrative    Originally from [place]. Moved to  in .      Family History   Problem  Relation Age of Onset    Hypertension Mother     Stomach cancer Father 65    Cancer Father     Hypertension Sister     Skin cancer Brother     No Known Problems Maternal Grandmother     No Known Problems Maternal Grandfather     No Known Problems Paternal Grandmother     No Known Problems Paternal Grandfather        Allergies:   Allergies   Allergen Reactions    Atorvastatin Myalgia       Medications:   Home Medications:  Current Outpatient Medications on File Prior to Visit   Medication Sig    atenolol (TENORMIN) 50 MG tablet Take 1 tablet by mouth 2 (Two) Times a Day.    losartan (COZAAR) 50 MG tablet Take 1 tablet by mouth Daily.    meclizine (ANTIVERT) 25 MG tablet TAKE 1 TABLET BY MOUTH THREE TIMES DAILY AS NEEDED FOR DIZZINESS    Mi-Acid Gas Relief 80 MG chewable tablet CHEW AND SWALLOW 1 TABLET BY MOUTH EVERY 6 HOURS AS NEEDED FOR GAS    oxaprozin (DAYPRO) 600 MG tablet     tamsulosin (FLOMAX) 0.4 MG capsule 24 hr capsule      No current facility-administered medications on file prior to visit.         ROS:  14 point review of systems was negative except as listed in the HPI     Physical Exam:   63 y.o. male  Body mass index is 32.56 kg/m²., 131 kg (289 lb)  Vitals:    01/12/24 0944   Resp: 20   SpO2: 99%         General: Alert, cooperative, appears well and in no observable distress.   HEENT: Normocephalic, atraumatic on external visual inspection. No icterus.   CV: No significant peripheral edema.   Respiratory: Normal respiratory effort.   Skin: Warm & well perfused; appropriate skin turgor.  Psych: Appropriate mood & affect.  Neuro: Gross sensation and motor intact in affected extremity/extremities.  Vascular: Peripheral pulses palpable in affected extremity/extremities. Calves/compartments soft and non tender, no evidence of DVT or compartment syndrome.    Ortho Exam      Left knee  Small pre patellar bursal fluid collection  No erythema, warmth  No mobility or strength limitation    Large Joint  Arthrocentesis: L knee  Date/Time: 1/12/2024 10:03 AM  Consent given by: patient  Site marked: site marked  Timeout: Immediately prior to procedure a time out was called to verify the correct patient, procedure, equipment, support staff and site/side marked as required   Supporting Documentation  Indications: pain and diagnostic evaluation   Procedure Details  Location: knee - L knee  Preparation: Patient was prepped and draped in the usual sterile fashion  Needle size: 18 G  Approach: lateral  Medications administered: 1 mL lidocaine 1 %  Aspirate amount: 7.5 mL  Aspirate: yellow  Patient tolerance: patient tolerated the procedure well with no immediate complications                     Assessment:  Left pre patellar bursitis               Plan:  Aspirated 7.5 cc today - see procedure documentation above  Keep ACE wrap applied  Follow up as needed   Patient encouraged to call with questions or concerns in the interim      AUSTIN Campbell

## 2024-01-14 LAB
BACTERIA FLD CULT: NORMAL
GRAM STN SPEC: NORMAL
GRAM STN SPEC: NORMAL

## 2024-01-19 ENCOUNTER — OFFICE VISIT (OUTPATIENT)
Dept: ORTHOPEDIC SURGERY | Facility: CLINIC | Age: 64
End: 2024-01-19
Payer: MEDICARE

## 2024-01-19 VITALS — OXYGEN SATURATION: 97 % | RESPIRATION RATE: 20 BRPM | HEIGHT: 78 IN | WEIGHT: 289 LBS | BODY MASS INDEX: 33.44 KG/M2

## 2024-01-19 DIAGNOSIS — M70.51 PATELLAR BURSITIS OF RIGHT KNEE: Primary | ICD-10-CM

## 2024-01-19 RX ORDER — LIDOCAINE HYDROCHLORIDE 10 MG/ML
2 INJECTION, SOLUTION INFILTRATION; PERINEURAL
Status: COMPLETED | OUTPATIENT
Start: 2024-01-19 | End: 2024-01-19

## 2024-01-19 RX ADMIN — LIDOCAINE HYDROCHLORIDE 2 ML: 10 INJECTION, SOLUTION INFILTRATION; PERINEURAL at 13:24

## 2024-01-19 NOTE — PROGRESS NOTES
FOLLOW UP VISIT        Patient Name: Olivier White  : 1960  Primary Care Physician: Karen Olivas APRN        Chief Complaint:  right knee bursitis    HPI:   Olivier White is a 63 y.o. year old who presents today for follow up of the above. Last seen 24 with 7.5 cc drained. Denies fever, warmth, erythema, numbness or significant pain      Past Medical/Surgical, Social and Family History:  I have reviewed and/or updated pertinent history as noted in the medical record including:  Past Medical History:   Diagnosis Date    Anesthesia complication     slow to arouse from full anesthesia    Arthritis     left foot    Gout     Heart murmur     Hypertension 2017    Lipoma 2016    Neck strain     Skin cancer of face     Sleep apnea     does not use machine     Past Surgical History:   Procedure Laterality Date    FOOT SURGERY      Repair snapped tendon. Left ankle/foot.    NECK SURGERY      Growth removal    SKIN CANCER EXCISION      TENDON LENGTHEN/SHORTEN ANKLE/LEG      repair    TOE AMPUTATION      VASECTOMY       Social History     Occupational History    Not on file   Tobacco Use    Smoking status: Former     Packs/day: 0.25     Years: 5.00     Additional pack years: 0.00     Total pack years: 1.25     Types: Cigarettes     Quit date: 1989     Years since quittin.0    Smokeless tobacco: Never    Tobacco comments:     Haven’t smoked for 33 yrs.   Vaping Use    Vaping Use: Never used   Substance and Sexual Activity    Alcohol use: Yes     Alcohol/week: 1.0 standard drink of alcohol     Types: 1 Cans of beer per week     Comment: Maybe one a week    Drug use: No    Sexual activity: Yes     Partners: Female     Comment: Vasectomy      Social History     Social History Narrative    Originally from [place]. Moved to  in .      Family History   Problem Relation Age of Onset    Hypertension Mother     Stomach cancer Father 65    Cancer Father     Hypertension  Sister     Skin cancer Brother     No Known Problems Maternal Grandmother     No Known Problems Maternal Grandfather     No Known Problems Paternal Grandmother     No Known Problems Paternal Grandfather        Allergies:   Allergies   Allergen Reactions    Atorvastatin Myalgia       Medications:   Home Medications:  Current Outpatient Medications on File Prior to Visit   Medication Sig    atenolol (TENORMIN) 50 MG tablet Take 1 tablet by mouth 2 (Two) Times a Day.    losartan (COZAAR) 50 MG tablet Take 1 tablet by mouth Daily.    meclizine (ANTIVERT) 25 MG tablet TAKE 1 TABLET BY MOUTH THREE TIMES DAILY AS NEEDED FOR DIZZINESS    Mi-Acid Gas Relief 80 MG chewable tablet CHEW AND SWALLOW 1 TABLET BY MOUTH EVERY 6 HOURS AS NEEDED FOR GAS    oxaprozin (DAYPRO) 600 MG tablet     tamsulosin (FLOMAX) 0.4 MG capsule 24 hr capsule      No current facility-administered medications on file prior to visit.         ROS:  14 point review of systems was negative except as listed in the HPI     Physical Exam:   63 y.o. male  Body mass index is 32.56 kg/m²., 131 kg (289 lb)  Vitals:    01/19/24 1302   Resp: 20   SpO2: 97%         General: Alert, cooperative, appears well and in no observable distress.   HEENT: Normocephalic, atraumatic on external visual inspection. No icterus.   CV: No significant peripheral edema.   Respiratory: Normal respiratory effort.   Skin: Warm & well perfused; appropriate skin turgor.  Psych: Appropriate mood & affect.  Neuro: Gross sensation and motor intact in affected extremity/extremities.  Vascular: Peripheral pulses palpable in affected extremity/extremities. Calves/compartments soft and non tender, no evidence of DVT or compartment syndrome.    Ortho Exam      Right knee  Pre patellar bursitis  No warmth, erythema or pain with compression    Investigations:  No new x-rays were needed today.        Large Joint Arthrocentesis: R knee  Date/Time: 1/19/2024 1:24 PM  Consent given by: patient  Site  marked: site marked  Timeout: Immediately prior to procedure a time out was called to verify the correct patient, procedure, equipment, support staff and site/side marked as required   Supporting Documentation  Indications: diagnostic evaluation   Procedure Details  Location: knee - R knee  Preparation: Patient was prepped and draped in the usual sterile fashion  Needle size: 18 G  Approach: lateral  Medications administered: 2 mL lidocaine 1 %  Aspirate amount: 9.5 mL  Aspirate: yellow  Patient tolerance: patient tolerated the procedure well with no immediate complications              Assessment:  Right pre patellar bursitis                Plan:  Aspiration performed today  Fluid non turbulent  ACE applied - recommend compression to continue  Patient encouraged to call with questions or concerns in the interim      Sol Lisa, APRN

## 2024-01-22 ENCOUNTER — OFFICE VISIT (OUTPATIENT)
Dept: FAMILY MEDICINE CLINIC | Facility: CLINIC | Age: 64
End: 2024-01-22
Payer: MEDICARE

## 2024-01-22 VITALS
RESPIRATION RATE: 18 BRPM | BODY MASS INDEX: 33.51 KG/M2 | DIASTOLIC BLOOD PRESSURE: 85 MMHG | SYSTOLIC BLOOD PRESSURE: 132 MMHG | WEIGHT: 289.6 LBS | HEART RATE: 61 BPM | HEIGHT: 78 IN | OXYGEN SATURATION: 100 %

## 2024-01-22 DIAGNOSIS — J02.9 SORE THROAT: ICD-10-CM

## 2024-01-22 DIAGNOSIS — R09.81 NASAL CONGESTION: ICD-10-CM

## 2024-01-22 DIAGNOSIS — R59.0 REACTIVE CERVICAL LYMPHADENOPATHY: Primary | ICD-10-CM

## 2024-01-22 PROCEDURE — 1160F RVW MEDS BY RX/DR IN RCRD: CPT | Performed by: STUDENT IN AN ORGANIZED HEALTH CARE EDUCATION/TRAINING PROGRAM

## 2024-01-22 PROCEDURE — 3079F DIAST BP 80-89 MM HG: CPT | Performed by: STUDENT IN AN ORGANIZED HEALTH CARE EDUCATION/TRAINING PROGRAM

## 2024-01-22 PROCEDURE — 99213 OFFICE O/P EST LOW 20 MIN: CPT | Performed by: STUDENT IN AN ORGANIZED HEALTH CARE EDUCATION/TRAINING PROGRAM

## 2024-01-22 PROCEDURE — 1159F MED LIST DOCD IN RCRD: CPT | Performed by: STUDENT IN AN ORGANIZED HEALTH CARE EDUCATION/TRAINING PROGRAM

## 2024-01-22 PROCEDURE — 3075F SYST BP GE 130 - 139MM HG: CPT | Performed by: STUDENT IN AN ORGANIZED HEALTH CARE EDUCATION/TRAINING PROGRAM

## 2024-01-22 RX ORDER — AMOXICILLIN AND CLAVULANATE POTASSIUM 875; 125 MG/1; MG/1
1 TABLET, FILM COATED ORAL 2 TIMES DAILY
Qty: 14 TABLET | Refills: 0 | Status: SHIPPED | OUTPATIENT
Start: 2024-01-22 | End: 2024-01-29

## 2024-01-22 NOTE — PROGRESS NOTES
"Chief Complaint  Chief Complaint   Patient presents with    Sore Throat     Throat swollen    Nasal Congestion     Subjective        Olivier White is a 63 y.o. male who presents to [facility].    History of Present Illness  Here for acute visit.    Above symptoms started 1 wk ago.  R sided throat swelling.  It was fine one day, he woke up the next and the R side of his neck was very swollen.  It has improved some since then, tends to fluctuate throughout the day.    He takes allegra every day for allergies.  He has some nasal congestion and drainage.    No fevers.  No pain.    No trouble swallowing or breathing.      Objective   /85   Pulse 61   Resp 18   Ht 200.7 cm (79\")   Wt 131 kg (289 lb 9.6 oz)   SpO2 100%   BMI 32.62 kg/m²     Estimated body mass index is 32.62 kg/m² as calculated from the following:    Height as of this encounter: 200.7 cm (79\").    Weight as of this encounter: 131 kg (289 lb 9.6 oz).     Physical Exam   GEN: In no acute distress, non toxic appearing  HEENT: Pupils equal and reactive to light, sclera clear. Mucous membranes moist. Oropharynx without erythema or exudate. Bilateral TM's wnl.  R anterior cervical swollen lymph node, palpable, non tender.    RESP: No IWOB     Result Review :              Assessment and Plan     Diagnoses and all orders for this visit:    1. Reactive cervical lymphadenopathy (Primary)  History and physical consistent with likely reactive lymphadenopathy.  Treat with augmentin bid x 7 days.  Heat and lymphatic massage recommended.  If no improvement in 1-2 wks return and we will consider blood work to make sure blood counts look fine, but the overnight increase in size and improvement since point more towards reactive etiology rather than malignancy.    -     amoxicillin-clavulanate (AUGMENTIN) 875-125 MG per tablet; Take 1 tablet by mouth 2 (Two) Times a Day for 7 days.  Dispense: 14 tablet; Refill: 0    2. Sore " throat  3. Nasal congestion  See above

## 2024-01-23 ENCOUNTER — TELEPHONE (OUTPATIENT)
Dept: FAMILY MEDICINE CLINIC | Facility: CLINIC | Age: 64
End: 2024-01-23
Payer: COMMERCIAL

## 2024-01-23 DIAGNOSIS — R59.0 REACTIVE CERVICAL LYMPHADENOPATHY: Primary | ICD-10-CM

## 2024-01-23 DIAGNOSIS — J02.9 SORE THROAT: ICD-10-CM

## 2024-01-23 RX ORDER — DOXYCYCLINE HYCLATE 100 MG/1
100 CAPSULE ORAL 2 TIMES DAILY
Qty: 10 CAPSULE | Refills: 0 | Status: SHIPPED | OUTPATIENT
Start: 2024-01-23 | End: 2024-01-28

## 2024-01-23 NOTE — TELEPHONE ENCOUNTER
Caller: Olivier White    Relationship: Self    Best call back number: [phone]    What medication are you requesting: SUBSTITUTE FOR AUGMENTIN    What are your current symptoms: TOOK THE MEDICATION AND HAD VOMITING ALL AFTERNOON AFTER HE TOOK THE PILL    How long have you been experiencing symptoms:     Have you had these symptoms before:    [] Yes  [x] No    Have you been treated for these symptoms before:   [] Yes  [x] No            If a prescription is needed, what is your preferred pharmacy and phone number: [id number] - [place], [place] - [address] - [phone]  - [phone]     Additional notes:  WILL BE LEAVING TOWN THIS AFTERNOON WILL NEED SOMETHING CALLED IN BEFORE THEN

## 2024-01-31 ENCOUNTER — OFFICE VISIT (OUTPATIENT)
Dept: FAMILY MEDICINE CLINIC | Facility: CLINIC | Age: 64
End: 2024-01-31
Payer: MEDICARE

## 2024-01-31 VITALS
WEIGHT: 287 LBS | OXYGEN SATURATION: 99 % | SYSTOLIC BLOOD PRESSURE: 120 MMHG | DIASTOLIC BLOOD PRESSURE: 89 MMHG | HEART RATE: 55 BPM | RESPIRATION RATE: 18 BRPM | BODY MASS INDEX: 33.21 KG/M2 | HEIGHT: 78 IN

## 2024-01-31 DIAGNOSIS — R59.0 REACTIVE CERVICAL LYMPHADENOPATHY: Primary | ICD-10-CM

## 2024-01-31 PROCEDURE — 3079F DIAST BP 80-89 MM HG: CPT | Performed by: STUDENT IN AN ORGANIZED HEALTH CARE EDUCATION/TRAINING PROGRAM

## 2024-01-31 PROCEDURE — 99213 OFFICE O/P EST LOW 20 MIN: CPT | Performed by: STUDENT IN AN ORGANIZED HEALTH CARE EDUCATION/TRAINING PROGRAM

## 2024-01-31 PROCEDURE — 3074F SYST BP LT 130 MM HG: CPT | Performed by: STUDENT IN AN ORGANIZED HEALTH CARE EDUCATION/TRAINING PROGRAM

## 2024-01-31 RX ORDER — METHYLPREDNISOLONE 4 MG/1
TABLET ORAL
Qty: 21 TABLET | Refills: 0 | Status: SHIPPED | OUTPATIENT
Start: 2024-01-31

## 2024-01-31 NOTE — PROGRESS NOTES
"Chief Complaint  Chief Complaint   Patient presents with    Edema     Swelling on right side of neck      Subjective        Olivier White is a 63 y.o. male who presents to [facility].    History of Present Illness  Seen on 1/22 for R sided reactive cervical lymphadenopathy.  Initially prescribed augmentin but had GI upset.  Transitioned to doxycycline.    He still has some mild swelling but overall improved.  No fevers.  He otherwise feels well.      Objective   /89   Pulse 55   Resp 18   Ht 200.7 cm (79\")   Wt 130 kg (287 lb)   SpO2 99%   BMI 32.33 kg/m²     Estimated body mass index is 32.33 kg/m² as calculated from the following:    Height as of this encounter: 200.7 cm (79\").    Weight as of this encounter: 130 kg (287 lb).     Physical Exam   GEN: In no acute distress, non toxic appearing  HEENT: Pupils equal and reactive to light, sclera clear. Mucous membranes moist. Oropharynx without erythema or exudate. Bilateral TM's wnl.  R anterior cervical swollen lymph node, palpable, non tender, decrease in size from previous.        Result Review :              Assessment and Plan     Diagnoses and all orders for this visit:    1. Reactive cervical lymphadenopathy (Primary)  Overall improvement in size of lymph node which again leans more towards reactive.  He is headed for 6 wk trip to [place] this coming weekend.  Will give medrol dose pack to have on hand should the swelling start to worsen while they are on the trip though I don't feel this should be an issue.   If the swelling does worsen in the future would consider CBC and US.    -     methylPREDNISolone (MEDROL) 4 MG dose pack; Take as directed on package instructions.  Dispense: 21 tablet; Refill: 0         "

## 2024-02-02 ENCOUNTER — OFFICE VISIT (OUTPATIENT)
Dept: ORTHOPEDIC SURGERY | Facility: CLINIC | Age: 64
End: 2024-02-02
Payer: MEDICARE

## 2024-02-02 VITALS — HEIGHT: 78 IN | BODY MASS INDEX: 33.21 KG/M2 | RESPIRATION RATE: 20 BRPM | WEIGHT: 287 LBS | OXYGEN SATURATION: 99 %

## 2024-02-02 DIAGNOSIS — M70.51 PATELLAR BURSITIS OF RIGHT KNEE: Primary | ICD-10-CM

## 2024-02-02 RX ORDER — LIDOCAINE HYDROCHLORIDE 10 MG/ML
2 INJECTION, SOLUTION INFILTRATION; PERINEURAL
Status: COMPLETED | OUTPATIENT
Start: 2024-02-02 | End: 2024-02-02

## 2024-02-02 RX ADMIN — LIDOCAINE HYDROCHLORIDE 2 ML: 10 INJECTION, SOLUTION INFILTRATION; PERINEURAL at 10:50

## 2024-02-02 NOTE — PROGRESS NOTES
FOLLOW UP VISIT        Patient Name: Olivier White  : 1960  Primary Care Physician: Karen Olivas APRN        Chief Complaint:  right knee bursitis, recurrent    HPI:   Olivier White is a 63 y.o. year old who presents today for follow up of the above. We have been draining intermittently for comfort. Uncomfortable but no significant pain or s/s infections.       Past Medical/Surgical, Social and Family History:  I have reviewed and/or updated pertinent history as noted in the medical record including:  Past Medical History:   Diagnosis Date    Anesthesia complication     slow to arouse from full anesthesia    Arthritis     left foot    Gout     Heart murmur     Hypertension 2017    Lipoma 2016    Neck strain     Skin cancer of face     Sleep apnea     does not use machine     Past Surgical History:   Procedure Laterality Date    COLONOSCOPY      EYE SURGERY  Detached retina    FOOT SURGERY      Repair snapped tendon. Left ankle/foot.    NECK SURGERY      Growth removal    SKIN CANCER EXCISION      TENDON LENGTHEN/SHORTEN ANKLE/LEG      repair    TOE AMPUTATION      VASECTOMY       Social History     Occupational History    Not on file   Tobacco Use    Smoking status: Former     Packs/day: 0.25     Years: 5.00     Additional pack years: 0.00     Total pack years: 1.25     Types: Cigarettes     Quit date: 1989     Years since quittin.1    Smokeless tobacco: Never    Tobacco comments:     Haven’t smoked for 33 yrs.   Vaping Use    Vaping Use: Never used   Substance and Sexual Activity    Alcohol use: Yes     Alcohol/week: 1.0 standard drink of alcohol     Types: 1 Cans of beer per week     Comment: Maybe one a week    Drug use: No    Sexual activity: Yes     Partners: Female     Comment: Vasectomy      Social History     Social History Narrative    Originally from [place]. Moved to [place] in .      Family History   Problem Relation Age of Onset     Hypertension Mother     Arthritis Mother     Stomach cancer Father 65    Cancer Father     Hypertension Sister     Skin cancer Brother     No Known Problems Maternal Grandmother     No Known Problems Maternal Grandfather     No Known Problems Paternal Grandmother     No Known Problems Paternal Grandfather        Allergies:   Allergies   Allergen Reactions    Atorvastatin Myalgia       Medications:   Home Medications:  Current Outpatient Medications on File Prior to Visit   Medication Sig    atenolol (TENORMIN) 50 MG tablet Take 1 tablet by mouth 2 (Two) Times a Day.    losartan (COZAAR) 50 MG tablet Take 1 tablet by mouth Daily.    methylPREDNISolone (MEDROL) 4 MG dose pack Take as directed on package instructions.    tamsulosin (FLOMAX) 0.4 MG capsule 24 hr capsule      No current facility-administered medications on file prior to visit.         ROS:  14 point review of systems was negative except as listed in the HPI     Physical Exam:   63 y.o. male  Body mass index is 32.33 kg/m²., 130 kg (287 lb)  Vitals:    02/02/24 1026   Resp: 20   SpO2: 99%         General: Alert, cooperative, appears well and in no observable distress.   HEENT: Normocephalic, atraumatic on external visual inspection. No icterus.   CV: No significant peripheral edema.   Respiratory: Normal respiratory effort.   Skin: Warm & well perfused; appropriate skin turgor.  Psych: Appropriate mood & affect.  Neuro: Gross sensation and motor intact in affected extremity/extremities.  Vascular: Peripheral pulses palpable in affected extremity/extremities. Calves/compartments soft and non tender, no evidence of DVT or compartment syndrome.    Ortho Exam      Right knee  Small pre patellar effusion  No erythema, warmth, joint swelling  Normal strength and ROM    Investigations:  No new x-rays were needed today.        Large Joint Arthrocentesis: R knee  Date/Time: 2/2/2024 10:50 AM  Consent given by: patient  Site marked: site marked  Timeout:  Immediately prior to procedure a time out was called to verify the correct patient, procedure, equipment, support staff and site/side marked as required   Supporting Documentation  Indications: diagnostic evaluation   Procedure Details  Location: knee - R knee  Needle size: 18 G  Approach: anterior  Medications administered: 2 mL lidocaine 1 %  Aspirate amount: 6.5 mL  Aspirate: yellow  Patient tolerance: patient tolerated the procedure well with no immediate complications              Assessment:  Right knee pre patella bursitis   Body mass index is 32.33 kg/m².  BMI consistent with Obese Class I: 30-34.9kg/m2             Plan:  Fluid aspiration performed today  ACE wrap to knee  Discussed ongoing management. Can continue with aspiration, but chronically recurs. If ongoing symptoms, could consider surgical intervention.   BMI reviewed  Follow up as needed   Patient encouraged to call with questions or concerns in the interim      AUTSIN Campbell

## 2024-03-28 ENCOUNTER — OFFICE VISIT (OUTPATIENT)
Dept: FAMILY MEDICINE CLINIC | Facility: CLINIC | Age: 64
End: 2024-03-28
Payer: MEDICARE

## 2024-03-28 VITALS
SYSTOLIC BLOOD PRESSURE: 132 MMHG | RESPIRATION RATE: 20 BRPM | BODY MASS INDEX: 33.78 KG/M2 | DIASTOLIC BLOOD PRESSURE: 80 MMHG | HEIGHT: 78 IN | OXYGEN SATURATION: 99 % | WEIGHT: 292 LBS | HEART RATE: 44 BPM

## 2024-03-28 DIAGNOSIS — R22.1 LOCALIZED SWELLING, MASS OR LUMP OF NECK: Primary | ICD-10-CM

## 2024-03-28 PROCEDURE — 3075F SYST BP GE 130 - 139MM HG: CPT | Performed by: NURSE PRACTITIONER

## 2024-03-28 PROCEDURE — 1159F MED LIST DOCD IN RCRD: CPT | Performed by: NURSE PRACTITIONER

## 2024-03-28 PROCEDURE — 1160F RVW MEDS BY RX/DR IN RCRD: CPT | Performed by: NURSE PRACTITIONER

## 2024-03-28 PROCEDURE — 3079F DIAST BP 80-89 MM HG: CPT | Performed by: NURSE PRACTITIONER

## 2024-03-28 PROCEDURE — 99213 OFFICE O/P EST LOW 20 MIN: CPT | Performed by: NURSE PRACTITIONER

## 2024-03-28 NOTE — PROGRESS NOTES
"Chief Complaint  Sore Throat (Swollen gland for weeks)    Subjective          Olivier White presents to [facility]  History of Present Illness    Is here today for follow up on right sided neck swelling which has been present for the past 2-3 months    Review of Systems   Constitutional:  Negative for fatigue and fever.   HENT:  Negative for ear pain, sore throat and trouble swallowing.    Respiratory: Negative.  Negative for shortness of breath.    Cardiovascular: Negative.  Negative for chest pain.   Allergic/Immunologic: Positive for environmental allergies.        He does have h/o seasonal allergies, uses zyrtec daily     Objective   Vital Signs:  /80   Pulse (!) 44   Resp 20   Ht 200.7 cm (79.02\")   Wt 132 kg (292 lb)   SpO2 99%   BMI 32.88 kg/m²     BP Readings from Last 3 Encounters:   03/28/24 132/80   01/31/24 120/89   01/22/24 132/85        Wt Readings from Last 3 Encounters:   03/28/24 132 kg (292 lb)   02/02/24 130 kg (287 lb)   01/31/24 130 kg (287 lb)      Physical Exam  Vitals reviewed.   Constitutional:       Appearance: Normal appearance.   HENT:      Right Ear: Tympanic membrane, ear canal and external ear normal.      Left Ear: Ear canal and external ear normal. There is impacted cerumen.   Neck:        Comments: Area of edema, no erythema, no tenderness  Cardiovascular:      Rate and Rhythm: Normal rate.   Pulmonary:      Effort: Pulmonary effort is normal.   Musculoskeletal:      Cervical back: No tenderness.   Skin:     General: Skin is warm.   Neurological:      Mental Status: He is alert.        Result Review :                 Assessment and Plan    Diagnoses and all orders for this visit:    1. Localized swelling, mass or lump of neck (Primary)  -     US Head Neck Soft Tissue; Future           Follow Up   Return if symptoms worsen or fail to improve.  Patient was given instructions and counseling regarding his condition or for health " maintenance advice. Please see specific information pulled into the AVS if appropriate.

## 2024-04-03 ENCOUNTER — OFFICE VISIT (OUTPATIENT)
Dept: ORTHOPEDIC SURGERY | Facility: CLINIC | Age: 64
End: 2024-04-03
Payer: MEDICARE

## 2024-04-03 VITALS — BODY MASS INDEX: 33.78 KG/M2 | OXYGEN SATURATION: 98 % | RESPIRATION RATE: 20 BRPM | HEIGHT: 78 IN | WEIGHT: 292 LBS

## 2024-04-03 DIAGNOSIS — M70.51 PATELLAR BURSITIS OF RIGHT KNEE: Primary | ICD-10-CM

## 2024-04-03 RX ADMIN — LIDOCAINE HYDROCHLORIDE 2 ML: 10 INJECTION, SOLUTION INFILTRATION; PERINEURAL at 08:35

## 2024-04-04 RX ORDER — LIDOCAINE HYDROCHLORIDE 10 MG/ML
2 INJECTION, SOLUTION INFILTRATION; PERINEURAL
Status: COMPLETED | OUTPATIENT
Start: 2024-04-03 | End: 2024-04-03

## 2024-04-04 NOTE — PROGRESS NOTES
FOLLOW UP VISIT        Patient Name: Olivier White  : 1960  Primary Care Physician: Karen Olivas APRN        Chief Complaint:  right knee bursitis, recurrent    HPI:   Olivier White is a 64 y.o. year old who presents today for follow up of the above.     We have drained the pre patellar bursal sac x 4 over the last 3.5 months. The fluid continues to re-accumulate despite compression. Fluid analysis in the past as returned non infectious.           Past Medical/Surgical, Social and Family History:  I have reviewed and/or updated pertinent history as noted in the medical record including:  Past Medical History:   Diagnosis Date    Anesthesia complication     slow to arouse from full anesthesia    Arthritis     left foot    Gout     Heart murmur     Hypertension 2017    Lipoma 2016    Neck strain     Skin cancer of face     Sleep apnea     does not use machine     Past Surgical History:   Procedure Laterality Date    COLONOSCOPY      EYE SURGERY  Detached retina    FOOT SURGERY      Repair snapped tendon. Left ankle/foot.    NECK SURGERY      Growth removal    SKIN CANCER EXCISION      TENDON LENGTHEN/SHORTEN ANKLE/LEG      repair    TOE AMPUTATION      VASECTOMY       Social History     Occupational History    Not on file   Tobacco Use    Smoking status: Former     Current packs/day: 0.00     Average packs/day: 0.3 packs/day for 5.0 years (1.3 ttl pk-yrs)     Types: Cigarettes     Start date: 1984     Quit date: 1989     Years since quittin.2    Smokeless tobacco: Never    Tobacco comments:     Haven’t smoked for 33 yrs.   Vaping Use    Vaping status: Never Used   Substance and Sexual Activity    Alcohol use: Yes     Alcohol/week: 1.0 standard drink of alcohol     Types: 1 Cans of beer per week     Comment: Maybe one a week    Drug use: No    Sexual activity: Yes     Partners: Female     Comment: Vasectomy      Social History     Social History Narrative     Originally from [place]. Moved to  in 1990.      Family History   Problem Relation Age of Onset    Hypertension Mother     Arthritis Mother     Stomach cancer Father 65    Cancer Father     Hypertension Sister     Skin cancer Brother     No Known Problems Maternal Grandmother     No Known Problems Maternal Grandfather     No Known Problems Paternal Grandmother     No Known Problems Paternal Grandfather        Allergies:   Allergies   Allergen Reactions    Atorvastatin Myalgia    Augmentin [Amoxicillin-Pot Clavulanate] Nausea And Vomiting       Medications:   Home Medications:  Current Outpatient Medications on File Prior to Visit   Medication Sig    atenolol (TENORMIN) 50 MG tablet Take 1 tablet by mouth 2 (Two) Times a Day.    losartan (COZAAR) 50 MG tablet Take 1 tablet by mouth Daily.    tamsulosin (FLOMAX) 0.4 MG capsule 24 hr capsule      No current facility-administered medications on file prior to visit.         ROS:  14 point review of systems was negative except as listed in the HPI     Physical Exam:   64 y.o. male  Body mass index is 32.9 kg/m²., 132 kg (292 lb)  Vitals:    04/03/24 1328   Resp: 20   SpO2: 98%         General: Alert, cooperative, appears well and in no observable distress.   HEENT: Normocephalic, atraumatic on external visual inspection. No icterus.   CV: No significant peripheral edema.   Respiratory: Normal respiratory effort.   Skin: Warm & well perfused; appropriate skin turgor.  Psych: Appropriate mood & affect.  Neuro: Gross sensation and motor intact in affected extremity/extremities.  Vascular: Peripheral pulses palpable in affected extremity/extremities. Calves/compartments soft and non tender, no evidence of DVT or compartment syndrome.    Ortho Exam      Right knee  Pre patellar compressible fluid sac  Mildly tender, non erythematic, cool to touch, no blanching  Does not appear infectious on visual inspection      Investigations:  No new x-rays were needed today.         Large Joint Arthrocentesis: R knee  Date/Time: 4/3/2024 8:35 AM  Consent given by: patient  Site marked: site marked  Timeout: Immediately prior to procedure a time out was called to verify the correct patient, procedure, equipment, support staff and site/side marked as required   Supporting Documentation  Indications: diagnostic evaluation   Procedure Details  Location: knee - R knee  Needle size: 18 G  Medications administered: 2 mL lidocaine 1 %  Aspirate amount: 11 mL  Aspirate: yellow  Patient tolerance: patient tolerated the procedure well with no immediate complications              Assessment:  Right patellar bursitis, recurrent, non infectious  Body mass index is 32.9 kg/m².  BMI consistent with Obese Class I: 30-34.9kg/m2             Plan:  Aspirated fluid today - see documentation above  Fluid appeared non infectious and previous analysis negative so labs not sent today  Today is 5th aspiration since January 2024  Discussed surgical evaluation with Dr. Mendieta for bursectomy   He is agreeable with this plan - appt scheduled today  Happy to see him in follow up for non surgical care  Patient encouraged to call with questions or concerns in the interim      AUSTIN Campbell

## 2024-04-10 DIAGNOSIS — R22.1 LOCALIZED SWELLING, MASS OR LUMP OF NECK: ICD-10-CM

## 2024-04-18 ENCOUNTER — OFFICE VISIT (OUTPATIENT)
Dept: ORTHOPEDIC SURGERY | Facility: CLINIC | Age: 64
End: 2024-04-18
Payer: MEDICARE

## 2024-04-18 ENCOUNTER — PREP FOR SURGERY (OUTPATIENT)
Dept: OTHER | Facility: HOSPITAL | Age: 64
End: 2024-04-18
Payer: COMMERCIAL

## 2024-04-18 VITALS — BODY MASS INDEX: 32.9 KG/M2 | OXYGEN SATURATION: 98 % | WEIGHT: 292 LBS | HEART RATE: 50 BPM

## 2024-04-18 DIAGNOSIS — M70.51 PATELLAR BURSITIS OF RIGHT KNEE: Primary | ICD-10-CM

## 2024-04-18 DIAGNOSIS — R94.120 ABNORMAL AUDITORY FUNCTION STUDY: ICD-10-CM

## 2024-04-18 PROCEDURE — 99214 OFFICE O/P EST MOD 30 MIN: CPT | Performed by: ORTHOPAEDIC SURGERY

## 2024-04-18 NOTE — PROGRESS NOTES
Patient ID: Olivier White is a 64 y.o. male.    Chief Complaint:    Chief Complaint   Patient presents with    Right Knee - Initial Evaluation       HPI:  This is a 64-year-old gentleman here with longstanding right knee pain he has had irritation in the bursa since riding in a taxicab resting his knee against the front seat.  Has had several aspirations there is no evidence any redness or infection present he is tired of its size and it bothers him to kneel on it  Past Medical History:   Diagnosis Date    Anesthesia complication     slow to arouse from full anesthesia    Arthritis     left foot    Gout     Heart murmur     Hypertension 2017    Lipoma 2016    Neck strain     Skin cancer of face     Sleep apnea     does not use machine       Past Surgical History:   Procedure Laterality Date    COLONOSCOPY      EYE SURGERY  Detached retina    FOOT SURGERY      Repair snapped tendon. Left ankle/foot.    NECK SURGERY      Growth removal    SKIN CANCER EXCISION      TENDON LENGTHEN/SHORTEN ANKLE/LEG      repair    TOE AMPUTATION      VASECTOMY         Family History   Problem Relation Age of Onset    Hypertension Mother     Arthritis Mother     Stomach cancer Father 65    Cancer Father     Hypertension Sister     Skin cancer Brother     No Known Problems Maternal Grandmother     No Known Problems Maternal Grandfather     No Known Problems Paternal Grandmother     No Known Problems Paternal Grandfather           Social History     Occupational History    Not on file   Tobacco Use    Smoking status: Former     Current packs/day: 0.00     Average packs/day: 0.3 packs/day for 5.0 years (1.3 ttl pk-yrs)     Types: Cigarettes     Start date: 1984     Quit date: 1989     Years since quittin.3    Smokeless tobacco: Never    Tobacco comments:     Haven’t smoked for 33 yrs.   Vaping Use    Vaping status: Never Used   Substance and Sexual Activity    Alcohol use: Yes     Alcohol/week: 1.0  standard drink of alcohol     Types: 1 Cans of beer per week     Comment: Maybe one a week    Drug use: No    Sexual activity: Yes     Partners: Female     Comment: Vasectomy      Review of Systems   Cardiovascular:  Negative for chest pain.   Musculoskeletal:  Positive for arthralgias.       Objective:    Pulse 50   Wt 132 kg (292 lb)   SpO2 98%   BMI 32.90 kg/m²     Physical Examination:  Right knee demonstrates an approximately 3 to 4 cm area of tissue collection in the prepatellar bursa there are no signs of infection no redness or warmth no knee effusion range of motion the knee 0-1 30 negative Srinath no instability    Imaging:  Prior x-ray demonstrates moderate patellofemoral arthritis and fluid/tissue in the prepatellar bursa    Assessment:  Right knee pre patella bursitis    Plan:    Options discussed he would like to proceed with prepatellar bursa excision possibility of recurrence nerve artery vein tendon damage anesthesia complications stiffness loss of life or limb discussed      Procedures         Disclaimer: Part of this note may be an electronic transcription/translation of spoken language to printed text using the Dragon Dictation System

## 2024-05-08 PROBLEM — M70.51 PATELLAR BURSITIS OF RIGHT KNEE: Status: ACTIVE | Noted: 2024-04-18

## 2024-05-14 ENCOUNTER — TELEPHONE (OUTPATIENT)
Dept: ORTHOPEDIC SURGERY | Facility: CLINIC | Age: 64
End: 2024-05-14
Payer: MEDICARE

## 2024-05-14 NOTE — TELEPHONE ENCOUNTER
Provider: JOHNNY    Caller: PATIENT    Phone Number: [phone]    Reason for Call: PATIENT STATES THAT HE IS SCHEDULED TO HAVE SURGERY ON HIS LEFT FOOT ON 05/31. HE IS ASKING IF HE HAS SURGERY ON HIS RIGHT KNEE ON 05/22, WILL HE BE ABLE TO PUT WEIGHT ON HIS RIGHT KNEE BY 05/31. HE STATES IF THIS IS GOING TO BE AN ISSUE, HE NEEDS TO R/S HIS KNEE SURGERY BECAUSE THE FOOT SURGERY IS MORE IMPORTANT RIGHT NOW. HE STATES HE HAS HIS PRE-OP VISITS SCHEDULED FOR TOMORROW, SO HE NEEDS TO KNOW ASAP PLEASE.

## 2024-05-14 NOTE — TELEPHONE ENCOUNTER
PLEASE ADVISE. PATIENT IS SCHEDULED 5/22 FOR RIGHT KNEE BURSA EXCISION. WILL HAVE ANY WEIGHTBEARING RESTRICTIONS? HAVING SURGERY ON HIS LEFT FOOT 5/31 AND WILL BE NON WEIGHTBEARING.

## 2024-05-16 ENCOUNTER — LAB (OUTPATIENT)
Dept: LAB | Facility: HOSPITAL | Age: 64
End: 2024-05-16
Payer: MEDICARE

## 2024-05-16 ENCOUNTER — HOSPITAL ENCOUNTER (OUTPATIENT)
Dept: CARDIOLOGY | Facility: HOSPITAL | Age: 64
Discharge: HOME | End: 2024-05-16
Payer: MEDICARE

## 2024-05-16 DIAGNOSIS — R94.120 ABNORMAL AUDITORY FUNCTION STUDY: ICD-10-CM

## 2024-05-16 DIAGNOSIS — M70.51 PATELLAR BURSITIS OF RIGHT KNEE: ICD-10-CM

## 2024-05-16 LAB
ANION GAP SERPL CALCULATED.3IONS-SCNC: 11 MMOL/L (ref 5–15)
BASOPHILS # BLD AUTO: 0.05 10*3/MM3 (ref 0–0.2)
BASOPHILS NFR BLD AUTO: 0.7 % (ref 0–1.5)
BUN SERPL-MCNC: 12 MG/DL (ref 8–23)
BUN/CREAT SERPL: 11.2 (ref 7–25)
CALCIUM SPEC-SCNC: 9.1 MG/DL (ref 8.6–10.5)
CHLORIDE SERPL-SCNC: 105 MMOL/L (ref 98–107)
CO2 SERPL-SCNC: 26 MMOL/L (ref 22–29)
CREAT SERPL-MCNC: 1.07 MG/DL (ref 0.76–1.27)
DEPRECATED RDW RBC AUTO: 42.9 FL (ref 37–54)
EGFRCR SERPLBLD CKD-EPI 2021: 76.5 ML/MIN/1.73
EOSINOPHIL # BLD AUTO: 0.21 10*3/MM3 (ref 0–0.4)
EOSINOPHIL NFR BLD AUTO: 3.1 % (ref 0.3–6.2)
ERYTHROCYTE [DISTWIDTH] IN BLOOD BY AUTOMATED COUNT: 12.8 % (ref 12.3–15.4)
GLUCOSE SERPL-MCNC: 102 MG/DL (ref 65–99)
HCT VFR BLD AUTO: 48.3 % (ref 37.5–51)
HGB BLD-MCNC: 16.9 G/DL (ref 13–17.7)
IMM GRANULOCYTES # BLD AUTO: 0.04 10*3/MM3 (ref 0–0.05)
IMM GRANULOCYTES NFR BLD AUTO: 0.6 % (ref 0–0.5)
LYMPHOCYTES # BLD AUTO: 1.45 10*3/MM3 (ref 0.7–3.1)
LYMPHOCYTES NFR BLD AUTO: 21.7 % (ref 19.6–45.3)
MCH RBC QN AUTO: 32.2 PG (ref 26.6–33)
MCHC RBC AUTO-ENTMCNC: 35 G/DL (ref 31.5–35.7)
MCV RBC AUTO: 92 FL (ref 79–97)
MONOCYTES # BLD AUTO: 0.48 10*3/MM3 (ref 0.1–0.9)
MONOCYTES NFR BLD AUTO: 7.2 % (ref 5–12)
NEUTROPHILS NFR BLD AUTO: 4.45 10*3/MM3 (ref 1.7–7)
NEUTROPHILS NFR BLD AUTO: 66.7 % (ref 42.7–76)
NRBC BLD AUTO-RTO: 0 /100 WBC (ref 0–0.2)
PLATELET # BLD AUTO: 209 10*3/MM3 (ref 140–450)
PMV BLD AUTO: 9.7 FL (ref 6–12)
POTASSIUM SERPL-SCNC: 3.9 MMOL/L (ref 3.5–5.2)
QT INTERVAL: 426 MS
QTC INTERVAL: 404 MS
RBC # BLD AUTO: 5.25 10*6/MM3 (ref 4.14–5.8)
SODIUM SERPL-SCNC: 142 MMOL/L (ref 136–145)
WBC NRBC COR # BLD AUTO: 6.68 10*3/MM3 (ref 3.4–10.8)

## 2024-05-16 PROCEDURE — 36415 COLL VENOUS BLD VENIPUNCTURE: CPT

## 2024-05-16 PROCEDURE — 93005 ELECTROCARDIOGRAM TRACING: CPT | Performed by: PODIATRIST

## 2024-05-16 PROCEDURE — 80048 BASIC METABOLIC PNL TOTAL CA: CPT

## 2024-05-16 PROCEDURE — 85025 COMPLETE CBC W/AUTO DIFF WBC: CPT

## 2024-05-20 ENCOUNTER — TELEPHONE (OUTPATIENT)
Dept: ORTHOPEDIC SURGERY | Facility: CLINIC | Age: 64
End: 2024-05-20
Payer: MEDICARE

## 2024-05-20 NOTE — TELEPHONE ENCOUNTER
Hub staff attempted to follow warm transfer process and was unsuccessful     Caller: Olivier White    Relationship to patient: Self    Best call back number:     Patient is needing: MR YOMAIRA STATED THAT THE LAST TIME HE HAD SX HIS PROVIDER PRESCRIBED HIS PAIN MEDICATION BEFORE HIS SURGERY SO HE COULD PICK IT UP AND HAVE IT ALREADY WHEN HE GOT HOME FROM SX. HE WOULD LIKE TO KNOW IF DR TURNER CAN DO THIS AS WELL

## 2024-05-20 NOTE — TELEPHONE ENCOUNTER
CALLED PATIENT AND LET HIM KNOW THAT DR. TURNER TRIES TO CALL IN MEDICATION THE DAY BEFORE SURGERY AND IF NOT THEN IT WILL BE SENT IN DAY OF. PATIENT UNDERSTOOD.

## 2024-05-23 RX ORDER — NAPROXEN 250 MG/1
250 TABLET ORAL 2 TIMES DAILY WITH MEALS
Qty: 28 TABLET | Refills: 0 | Status: SHIPPED | OUTPATIENT
Start: 2024-05-23 | End: 2024-05-31 | Stop reason: HOSPADM

## 2024-05-23 RX ORDER — CEPHALEXIN 500 MG/1
500 CAPSULE ORAL 4 TIMES DAILY
Qty: 4 CAPSULE | Refills: 0 | Status: SHIPPED | OUTPATIENT
Start: 2024-05-23 | End: 2024-05-24

## 2024-05-23 RX ORDER — HYDROCODONE BITARTRATE AND ACETAMINOPHEN 5; 325 MG/1; MG/1
1 TABLET ORAL EVERY 6 HOURS PRN
Qty: 10 TABLET | Refills: 0 | Status: SHIPPED | OUTPATIENT
Start: 2024-05-23 | End: 2024-05-31 | Stop reason: HOSPADM

## 2024-05-23 RX ORDER — PROMETHAZINE HYDROCHLORIDE 12.5 MG/1
12.5 TABLET ORAL EVERY 6 HOURS PRN
Qty: 21 TABLET | Refills: 1 | Status: SHIPPED | OUTPATIENT
Start: 2024-05-23

## 2024-05-23 NOTE — H&P
[facility]   HISTORY AND PHYSICAL    Patient Name: Olivier White  : 1960  MRN: [id number]  Primary Care Physician:  Karen Olivas APRN  Date of admission: (Not on file)    Subjective   Subjective     Chief Complaint: Right knee pain    This is a 64-year-old gentleman with longstanding right knee mass presenting for knee bursa excision        Review of Systems   Cardiovascular:  Negative for chest pain.   Musculoskeletal:  Positive for arthralgias.        Personal History     Past Medical History:   Diagnosis Date    Anesthesia complication     slow to arouse from full anesthesia    Arthritis     left foot    Gout     Heart murmur     Hypertension 2017    Lipoma 2016    Neck strain     Skin cancer of face     Sleep apnea     does not use machine    Urinary hesitancy        Past Surgical History:   Procedure Laterality Date    COLONOSCOPY      EYE SURGERY  Detached retina    FOOT SURGERY      Repair snapped tendon. Left ankle/foot.    NECK SURGERY      Growth removal    SKIN CANCER EXCISION      TENDON LENGTHEN/SHORTEN ANKLE/LEG      repair    TOE AMPUTATION      VASECTOMY         Family History: family history includes Arthritis in his mother; Cancer in his father; Hypertension in his mother and sister; No Known Problems in his maternal grandfather, maternal grandmother, paternal grandfather, and paternal grandmother; Skin cancer in his brother; Stomach cancer (age of onset: 65) in his father. Otherwise pertinent FHx was reviewed and not pertinent to current issue.    Social History:  reports that he quit smoking about 35 years ago. His smoking use included cigarettes. He started smoking about 40 years ago. He has a 1.3 pack-year smoking history. He has never used smokeless tobacco. He reports current alcohol use of about 1.0 standard drink of alcohol per week. He reports that he does not use drugs.    Home Medications:  atenolol, losartan, and  tamsulosin    Allergies:  Allergies   Allergen Reactions    Atorvastatin Myalgia    Augmentin [Amoxicillin-Pot Clavulanate] Nausea And Vomiting       Objective    Objective   Right knee demonstrates an approximately 3 to 4 cm area of tissue collection in the prepatellar bursa there are no signs of infection no redness or warmth no knee effusion range of motion the knee 0-1 30 negative Srinath no instability     Imaging:  Prior x-ray demonstrates moderate patellofemoral arthritis and fluid/tissue in the prepatellar bursa     Assessment:  Right knee pre patella bursitis     Plan:     Options discussed he would like to proceed with prepatellar bursa excision possibility of recurrence nerve artery vein tendon damage anesthesia complications stiffness loss of life or limb discussed  Karl Mendieta MD

## 2024-05-24 ENCOUNTER — ANESTHESIA (OUTPATIENT)
Dept: PERIOP | Facility: HOSPITAL | Age: 64
End: 2024-05-24
Payer: MEDICARE

## 2024-05-24 ENCOUNTER — ANESTHESIA EVENT (OUTPATIENT)
Dept: PERIOP | Facility: HOSPITAL | Age: 64
End: 2024-05-24
Payer: MEDICARE

## 2024-05-24 ENCOUNTER — HOSPITAL ENCOUNTER (OUTPATIENT)
Facility: HOSPITAL | Age: 64
Setting detail: HOSPITAL OUTPATIENT SURGERY
Discharge: HOME | End: 2024-05-24
Attending: ORTHOPAEDIC SURGERY | Admitting: ORTHOPAEDIC SURGERY
Payer: MEDICARE

## 2024-05-24 VITALS
DIASTOLIC BLOOD PRESSURE: 64 MMHG | HEART RATE: 48 BPM | OXYGEN SATURATION: 100 % | BODY MASS INDEX: 31.82 KG/M2 | WEIGHT: 275 LBS | TEMPERATURE: 97 F | RESPIRATION RATE: 11 BRPM | HEIGHT: 78 IN | SYSTOLIC BLOOD PRESSURE: 129 MMHG

## 2024-05-24 DIAGNOSIS — M70.51 PATELLAR BURSITIS OF RIGHT KNEE: Primary | ICD-10-CM

## 2024-05-24 PROCEDURE — 25010000002 FENTANYL CITRATE (PF) 100 MCG/2ML SOLUTION: Performed by: NURSE ANESTHETIST, CERTIFIED REGISTERED

## 2024-05-24 PROCEDURE — 25810000003 LACTATED RINGERS PER 1000 ML: Performed by: ORTHOPAEDIC SURGERY

## 2024-05-24 PROCEDURE — 25010000002 BUPIVACAINE (PF) 0.25 % SOLUTION 10 ML VIAL: Performed by: ORTHOPAEDIC SURGERY

## 2024-05-24 PROCEDURE — 25010000002 ONDANSETRON PER 1 MG: Performed by: NURSE ANESTHETIST, CERTIFIED REGISTERED

## 2024-05-24 PROCEDURE — 25010000002 KETOROLAC TROMETHAMINE PER 15 MG: Performed by: NURSE ANESTHETIST, CERTIFIED REGISTERED

## 2024-05-24 PROCEDURE — 25010000002 PROPOFOL 200 MG/20ML EMULSION: Performed by: NURSE ANESTHETIST, CERTIFIED REGISTERED

## 2024-05-24 PROCEDURE — 25010000002 DEXAMETHASONE PER 1 MG: Performed by: NURSE ANESTHETIST, CERTIFIED REGISTERED

## 2024-05-24 PROCEDURE — 88304 TISSUE EXAM BY PATHOLOGIST: CPT | Performed by: ORTHOPAEDIC SURGERY

## 2024-05-24 PROCEDURE — 27340 REMOVAL OF KNEECAP BURSA: CPT | Performed by: ORTHOPAEDIC SURGERY

## 2024-05-24 PROCEDURE — 25010000002 CEFAZOLIN 3 G RECONSTITUTED SOLUTION 1 EACH VIAL: Performed by: ORTHOPAEDIC SURGERY

## 2024-05-24 PROCEDURE — 25010000002 LIDOCAINE 1 % SOLUTION 20 ML VIAL: Performed by: ORTHOPAEDIC SURGERY

## 2024-05-24 RX ORDER — NALOXONE HCL 0.4 MG/ML
0.4 VIAL (ML) INJECTION AS NEEDED
Status: DISCONTINUED | OUTPATIENT
Start: 2024-05-24 | End: 2024-05-24 | Stop reason: HOSPADM

## 2024-05-24 RX ORDER — HYDRALAZINE HYDROCHLORIDE 20 MG/ML
5 INJECTION INTRAMUSCULAR; INTRAVENOUS
Status: DISCONTINUED | OUTPATIENT
Start: 2024-05-24 | End: 2024-05-24 | Stop reason: HOSPADM

## 2024-05-24 RX ORDER — DIPHENHYDRAMINE HYDROCHLORIDE 50 MG/ML
12.5 INJECTION INTRAMUSCULAR; INTRAVENOUS
Status: DISCONTINUED | OUTPATIENT
Start: 2024-05-24 | End: 2024-05-24 | Stop reason: HOSPADM

## 2024-05-24 RX ORDER — ONDANSETRON 2 MG/ML
4 INJECTION INTRAMUSCULAR; INTRAVENOUS ONCE AS NEEDED
Status: DISCONTINUED | OUTPATIENT
Start: 2024-05-24 | End: 2024-05-24 | Stop reason: HOSPADM

## 2024-05-24 RX ORDER — DIPHENHYDRAMINE HYDROCHLORIDE 50 MG/ML
12.5 INJECTION INTRAMUSCULAR; INTRAVENOUS ONCE AS NEEDED
Status: DISCONTINUED | OUTPATIENT
Start: 2024-05-24 | End: 2024-05-24 | Stop reason: HOSPADM

## 2024-05-24 RX ORDER — FLUMAZENIL 0.1 MG/ML
0.2 INJECTION INTRAVENOUS AS NEEDED
Status: DISCONTINUED | OUTPATIENT
Start: 2024-05-24 | End: 2024-05-24 | Stop reason: HOSPADM

## 2024-05-24 RX ORDER — LABETALOL HYDROCHLORIDE 5 MG/ML
5 INJECTION, SOLUTION INTRAVENOUS
Status: DISCONTINUED | OUTPATIENT
Start: 2024-05-24 | End: 2024-05-24 | Stop reason: HOSPADM

## 2024-05-24 RX ORDER — SODIUM CHLORIDE 0.9 % (FLUSH) 0.9 %
10 SYRINGE (ML) INJECTION AS NEEDED
Status: DISCONTINUED | OUTPATIENT
Start: 2024-05-24 | End: 2024-05-24 | Stop reason: HOSPADM

## 2024-05-24 RX ORDER — OXYCODONE HYDROCHLORIDE 5 MG/1
10 TABLET ORAL EVERY 4 HOURS PRN
Status: DISCONTINUED | OUTPATIENT
Start: 2024-05-24 | End: 2024-05-24 | Stop reason: HOSPADM

## 2024-05-24 RX ORDER — LIDOCAINE HYDROCHLORIDE 10 MG/ML
0.5 INJECTION, SOLUTION INFILTRATION; PERINEURAL ONCE AS NEEDED
Status: DISCONTINUED | OUTPATIENT
Start: 2024-05-24 | End: 2024-05-24 | Stop reason: HOSPADM

## 2024-05-24 RX ORDER — FENTANYL CITRATE 50 UG/ML
INJECTION, SOLUTION INTRAMUSCULAR; INTRAVENOUS AS NEEDED
Status: DISCONTINUED | OUTPATIENT
Start: 2024-05-24 | End: 2024-05-24 | Stop reason: SURG

## 2024-05-24 RX ORDER — KETOROLAC TROMETHAMINE 30 MG/ML
INJECTION, SOLUTION INTRAMUSCULAR; INTRAVENOUS AS NEEDED
Status: DISCONTINUED | OUTPATIENT
Start: 2024-05-24 | End: 2024-05-24 | Stop reason: SURG

## 2024-05-24 RX ORDER — SODIUM CHLORIDE, SODIUM LACTATE, POTASSIUM CHLORIDE, CALCIUM CHLORIDE 600; 310; 30; 20 MG/100ML; MG/100ML; MG/100ML; MG/100ML
1000 INJECTION, SOLUTION INTRAVENOUS CONTINUOUS
Status: DISCONTINUED | OUTPATIENT
Start: 2024-05-24 | End: 2024-05-24 | Stop reason: HOSPADM

## 2024-05-24 RX ORDER — IPRATROPIUM BROMIDE AND ALBUTEROL SULFATE 2.5; .5 MG/3ML; MG/3ML
3 SOLUTION RESPIRATORY (INHALATION) ONCE AS NEEDED
Status: DISCONTINUED | OUTPATIENT
Start: 2024-05-24 | End: 2024-05-24 | Stop reason: HOSPADM

## 2024-05-24 RX ORDER — EPHEDRINE SULFATE 5 MG/ML
5 INJECTION INTRAVENOUS ONCE AS NEEDED
Status: DISCONTINUED | OUTPATIENT
Start: 2024-05-24 | End: 2024-05-24 | Stop reason: HOSPADM

## 2024-05-24 RX ORDER — OXYCODONE HYDROCHLORIDE 5 MG/1
5 TABLET ORAL ONCE AS NEEDED
Status: DISCONTINUED | OUTPATIENT
Start: 2024-05-24 | End: 2024-05-24 | Stop reason: HOSPADM

## 2024-05-24 RX ORDER — PROPOFOL 10 MG/ML
INJECTION, EMULSION INTRAVENOUS AS NEEDED
Status: DISCONTINUED | OUTPATIENT
Start: 2024-05-24 | End: 2024-05-24 | Stop reason: SURG

## 2024-05-24 RX ORDER — DEXAMETHASONE SODIUM PHOSPHATE 4 MG/ML
INJECTION, SOLUTION INTRA-ARTICULAR; INTRALESIONAL; INTRAMUSCULAR; INTRAVENOUS; SOFT TISSUE AS NEEDED
Status: DISCONTINUED | OUTPATIENT
Start: 2024-05-24 | End: 2024-05-24 | Stop reason: SURG

## 2024-05-24 RX ORDER — EPHEDRINE SULFATE 5 MG/ML
INJECTION INTRAVENOUS AS NEEDED
Status: DISCONTINUED | OUTPATIENT
Start: 2024-05-24 | End: 2024-05-24 | Stop reason: SURG

## 2024-05-24 RX ORDER — ONDANSETRON 2 MG/ML
INJECTION INTRAMUSCULAR; INTRAVENOUS AS NEEDED
Status: DISCONTINUED | OUTPATIENT
Start: 2024-05-24 | End: 2024-05-24 | Stop reason: SURG

## 2024-05-24 RX ADMIN — EPHEDRINE SULFATE 10 MG: 5 INJECTION INTRAVENOUS at 14:31

## 2024-05-24 RX ADMIN — KETOROLAC TROMETHAMINE 15 MG: 30 INJECTION, SOLUTION INTRAMUSCULAR at 14:45

## 2024-05-24 RX ADMIN — PROPOFOL 250 MG: 10 INJECTION, EMULSION INTRAVENOUS at 14:23

## 2024-05-24 RX ADMIN — DEXAMETHASONE SODIUM PHOSPHATE 8 MG: 4 INJECTION, SOLUTION INTRAMUSCULAR; INTRAVENOUS at 14:42

## 2024-05-24 RX ADMIN — EPHEDRINE SULFATE 10 MG: 5 INJECTION INTRAVENOUS at 14:45

## 2024-05-24 RX ADMIN — LIDOCAINE HYDROCHLORIDE 100 MG: 20 INJECTION, SOLUTION INTRAVENOUS at 14:23

## 2024-05-24 RX ADMIN — SODIUM CHLORIDE 3 G: 900 INJECTION INTRAVENOUS at 14:09

## 2024-05-24 RX ADMIN — ONDANSETRON 4 MG: 2 INJECTION INTRAMUSCULAR; INTRAVENOUS at 14:42

## 2024-05-24 RX ADMIN — FENTANYL CITRATE 100 MCG: 50 INJECTION, SOLUTION INTRAMUSCULAR; INTRAVENOUS at 14:23

## 2024-05-24 RX ADMIN — SODIUM CHLORIDE, POTASSIUM CHLORIDE, SODIUM LACTATE AND CALCIUM CHLORIDE 1000 ML: 600; 310; 30; 20 INJECTION, SOLUTION INTRAVENOUS at 13:05

## 2024-05-24 NOTE — ANESTHESIA PROCEDURE NOTES
Airway  Urgency: elective    Date/Time: 5/24/2024 2:27 PM  Airway not difficult    General Information and Staff    Patient location during procedure: OR  CRNA/CAA: Yonny Dexter CRNA    Indications and Patient Condition    Preoxygenated: yes  Mask difficulty assessment: 0 - not attempted    Final Airway Details  Final airway type: supraglottic airway      Successful airway: I-gel  Size 5     Number of attempts at approach: 1  Assessment: lips, teeth, and gum same as pre-op and atraumatic intubation

## 2024-05-24 NOTE — ANESTHESIA PREPROCEDURE EVALUATION
Anesthesia Evaluation     Patient summary reviewed and Nursing notes reviewed   NPO Solid Status: > 8 hours             Airway   Mallampati: II  TM distance: >3 FB  Neck ROM: full  No difficulty expected  Dental - normal exam     Pulmonary - normal exam   (+) a smoker Former,  Cardiovascular - normal exam    ECG reviewed    (+) hypertension well controlled  (-) angina, GAMBINO      Neuro/Psych- negative ROS  GI/Hepatic/Renal/Endo - negative ROS     Musculoskeletal (-) negative ROS    Abdominal  - normal exam    Bowel sounds: normal.   Substance History - negative use     OB/GYN negative ob/gyn ROS         Other                    Anesthesia Plan    ASA 2     general       Anesthetic plan, risks, benefits, and alternatives have been provided, discussed and informed consent has been obtained with: patient.    CODE STATUS:

## 2024-05-24 NOTE — OP NOTE
PATELLA BURSA EXCISION  Procedure Report    Patient Name:  Olivier White  YOB: 1960    Date of Surgery:  5/24/2024     Indications: This is a 64 y.o. male with pain and swelling to the  right knee .  Work-up demonstrated soft tissue infection with chronic prepatellar bursitis Treatment options were discussed.  They desired to proceed with prepatellar bursa excision  discussing the risks including bleeding, scarring,infection, stiffness, nerve damage, tendon damage, artery damage, continued pain, DVT, malunion, nonunion, loss of life or limb, and a need for further surgery including hardware removal.      Pre-op Diagnosis:   Patellar bursitis of right knee [M70.51]    Postop Diagnosis       Post-Op Diagnosis Codes:     * Patellar bursitis of right knee [M70.51]    Procedure/CPT® Codes:  46491    Procedure    Procedure(s):right  PATELLA BURSA EXCISION    Assistant: Abdifatah Mckenzie first assist    was responsible for performing the following activities: Retraction, Suction, Irrigation, Suturing, Closing, and Placing Dressing and their skilled assistance was necessary for the success of this case.         Anesthesia: General    IVF: See anesthesia record    Estimated Blood Loss: minimal    Implants:    Nothing was implanted during the procedure      Complications: None    Tourniquet: 12 minutes    Specimens: Tissue to pathology    Description of Procedure: The patient's operative site was marked.  Patient was brought to the operating room and placed on the operating room table.  General anesthesia was administered.   A timeout was taken to confirm the correct operative site and procedure.    The leg was then prepped and draped in a standard surgical fashion and a tourniquet placed on the upper leg.  Leg was elevated and tourniquet inflated.    Incision marked over the area of swelling.  Skin was opened and hemorrhagic prepatellar bursa was identified excised completely no deep extension no sign of  infection   Wound was further irrigated and closed with suture and staples .   A sterile dressing was applied.  Tourniquet released.  Patient was awakened and taken to the recovery room.  There were no complications.  I was present for all portions.  Counts were correct.  Good capillary refill was noted of the digits.        Karl Mendieta MD     Date: 5/24/2024  Time: 15:27 EDT

## 2024-05-25 NOTE — ANESTHESIA POSTPROCEDURE EVALUATION
Patient: Olivier White    Procedure Summary       Date: 05/24/24 Room / Location: [facility] OR 11 / [facility] MAIN OR    Anesthesia Start: 1415 Anesthesia Stop: 1507    Procedure: PATELLA BURSA EXCISION (Right: Knee) Diagnosis:       Patellar bursitis of right knee      (Patellar bursitis of right knee [M70.51])    Surgeons: Karl Mendieta MD Provider: Adarsh Ayoub MD    Anesthesia Type: general ASA Status: 2            Anesthesia Type: general    Vitals  Vitals Value Taken Time   /60 05/24/24 1546   Temp 97 °F (36.1 °C) 05/24/24 1546   Pulse 47 05/24/24 1546   Resp 8 05/24/24 1546   SpO2 98 % 05/24/24 1546           Post Anesthesia Care and Evaluation    Patient location during evaluation: PACU  Patient participation: complete - patient participated  Level of consciousness: awake  Pain scale: See nurse's notes for pain score.  Pain management: adequate    Airway patency: patent  Anesthetic complications: No anesthetic complications  PONV Status: none  Cardiovascular status: acceptable  Respiratory status: acceptable and spontaneous ventilation  Hydration status: acceptable    Comments: Patient seen and examined postoperatively; vital signs stable; SpO2 greater than or equal to 90%; cardiopulmonary status stable; nausea/vomiting adequately controlled; pain adequately controlled; no apparent anesthesia complications; patient discharged from anesthesia care when discharge criteria were met

## 2024-05-29 LAB
LAB AP CASE REPORT: NORMAL
PATH REPORT.FINAL DX SPEC: NORMAL
PATH REPORT.GROSS SPEC: NORMAL

## 2024-05-30 ENCOUNTER — ANESTHESIA EVENT (OUTPATIENT)
Dept: PERIOP | Facility: HOSPITAL | Age: 64
End: 2024-05-30
Payer: MEDICARE

## 2024-05-30 NOTE — H&P
FOOT AND ANKLE SURGERY HISTORY AND PHYSICAL      Patient Identification:  Name: Olivier White  Age: 64 y.o.  Sex: male  : 1960  MRN: [id number]      Chief Complaint: recurrent ulceration left foot    History of Present Illness:  Olivier White is a 64 y.o. male who presents today for surgery.  Patient has failed  conservative treatments and elects to proceed with surgery at this time.  Patient has been NPO for the past 8 hours. Patient denies any nausea, vomiting, fever, chills.  No other changes from previously performed H&P performed in office, please see physician  notes as needed.      Problem List:  Past Medical History:  Past Medical History:   Diagnosis Date    Anesthesia complication     slow to arouse from full anesthesia    Arthritis     left foot    Gout     Heart murmur     Hypertension 2017    Lipoma 2016    Neck strain     Skin cancer of face     Sleep apnea     does not use machine    Urinary hesitancy      Past Surgical History:  Past Surgical History:   Procedure Laterality Date    COLONOSCOPY      EYE SURGERY  Detached retina    FOOT SURGERY      Repair snapped tendon. Left ankle/foot.    NECK SURGERY      Growth removal    PATELLA BURSA EXCISION Right 2024    Procedure: PATELLA BURSA EXCISION;  Surgeon: Karl Mendieta MD;  Location: [facility] MAIN OR;  Service: Orthopedics;  Laterality: Right;    SKIN CANCER EXCISION      TENDON LENGTHEN/SHORTEN ANKLE/LEG      repair    TOE AMPUTATION      VASECTOMY       Home Meds:  No medications prior to admission.     Current Meds:  Allergies:    Allergies   Allergen Reactions    Atorvastatin Myalgia    Augmentin [Amoxicillin-Pot Clavulanate] Nausea And Vomiting       Social History     Tobacco Use    Smoking status: Former     Current packs/day: 0.00     Average packs/day: 0.3 packs/day for 5.0 years (1.3 ttl pk-yrs)     Types: Cigarettes     Start date: 1984     Quit date: 1989     Years since quitting:  35.4    Smokeless tobacco: Never    Tobacco comments:     Haven’t smoked for 33 yrs.   Substance Use Topics    Alcohol use: Yes     Alcohol/week: 1.0 standard drink of alcohol     Types: 1 Cans of beer per week     Comment: Maybe one a week     Family History:  Family History   Problem Relation Age of Onset    Hypertension Mother     Arthritis Mother     Stomach cancer Father 65    Cancer Father     Hypertension Sister     Skin cancer Brother     No Known Problems Maternal Grandmother     No Known Problems Maternal Grandfather     No Known Problems Paternal Grandmother     No Known Problems Paternal Grandfather          Review of Systems  Review of Systems - General ROS: negative for - chills, fatigue, malaise, weight gain or weight  loss  Endocrine ROS: negative for - hair pattern changes, hot flashes, malaise/lethargy, temperature  intolerance or unexpected weight changes  Respiratory ROS: negative for - cough, hemoptysis, shortness of breath, tachypnea or wheezing  Cardiovascular ROS: negative for - chest pain, dyspnea on exertion, irregular heartbeat, loss of  consciousness, murmur, palpitations, rapid heart rate or shortness of breath  Musculoskeletal ROS: djd with exostosis noted left foot.   Neurological ROS: negative for - behavioral changes, confusion, dizziness, gait disturbance,  seizures or weakness. Light touch and protective sensation diminished b/l feet.       Objective:    Vitals:  See chart  Exam:  General appearance: alert, appears stated age, cooperative and no distress  Head: Normocephalic, without obvious abnormality, atraumatic  Eyes: conjunctivae/corneas clear. PERRL.  Nose: Nares normal. Septum midline. No drainage or sinus tenderness.  Throat: lips, mucosa, and tongue normal; teeth and gums normal  Neck: supple, symmetrical, trachea midline    Back: symmetric, no curvature. ROM normal. No CVA tenderness.  Lungs: clear to auscultation bilaterally  Heart: regular rate and rhythm, S1, S2  normal, no murmur, click, rub or gallop  Abdomen:  soft, non-tender, no masses      A Lower Extremity Exam was performed  There have been no changes in physical examination since the preoperative examination in  office. Patient's neurovascular status is unchanged since last visit on 5/21/24        Data Review:  Reviewed by me    Imaging and Diagnostic Studies:  Done in office    Assessment:  1. Djd left foot  2. Exostosis left foot  3. History of ulceration left foot with decreased sensation   Plan:  Reviewed findings and treatment options with patient at bedside.  Discussed procedure(s), along with risks, complications, and recovery with patient at length. Risks include but not limited to bleeding infection nerve damage loss of limb loss of life continue ulceration worsening ulceration  All questions were answered to patient's satisfaction no guarantees given or implied. .  Plan for exostosis removal left foot  Follow up with Dr. Stevens post operatively early next week.

## 2024-05-30 NOTE — ANESTHESIA PREPROCEDURE EVALUATION
Anesthesia Evaluation     Patient summary reviewed and Nursing notes reviewed   NPO Solid Status: > 8 hours  NPO Liquid Status: > 8 hours           Airway   Mallampati: II  TM distance: >3 FB  Neck ROM: full  No difficulty expected  Dental - normal exam     Pulmonary - normal exam    breath sounds clear to auscultation  (+) a smoker Former, Abstained day of surgery, cigarettes,sleep apnea  Cardiovascular - normal exam  Exercise tolerance: unable to assess    ECG reviewed  Rhythm: regular  Rate: normal    (+) hypertension      Neuro/Psych- negative ROS  GI/Hepatic/Renal/Endo    (+) morbid obesity    Musculoskeletal     Abdominal  - normal exam   Substance History - negative use     OB/GYN negative ob/gyn ROS         Other   arthritis,   history of cancer remission                  Anesthesia Plan    ASA 3     general     (MAC, Block per Surgeon. LMA for airway management if necessary)  intravenous induction     Anesthetic plan, risks, benefits, and alternatives have been provided, discussed and informed consent has been obtained with: patient.    CODE STATUS:

## 2024-05-31 ENCOUNTER — APPOINTMENT (OUTPATIENT)
Dept: GENERAL RADIOLOGY | Facility: HOSPITAL | Age: 64
End: 2024-05-31
Payer: MEDICARE

## 2024-05-31 ENCOUNTER — ANESTHESIA (OUTPATIENT)
Dept: PERIOP | Facility: HOSPITAL | Age: 64
End: 2024-05-31
Payer: MEDICARE

## 2024-05-31 ENCOUNTER — HOSPITAL ENCOUNTER (OUTPATIENT)
Facility: HOSPITAL | Age: 64
Setting detail: HOSPITAL OUTPATIENT SURGERY
Discharge: HOME | End: 2024-05-31
Attending: PODIATRIST | Admitting: PODIATRIST
Payer: MEDICARE

## 2024-05-31 VITALS
SYSTOLIC BLOOD PRESSURE: 128 MMHG | TEMPERATURE: 98 F | HEART RATE: 52 BPM | HEIGHT: 78 IN | DIASTOLIC BLOOD PRESSURE: 79 MMHG | RESPIRATION RATE: 14 BRPM | BODY MASS INDEX: 33.18 KG/M2 | WEIGHT: 286.8 LBS | OXYGEN SATURATION: 99 %

## 2024-05-31 DIAGNOSIS — M89.372 HYPERTROPHY OF BONE, LEFT ANKLE AND FOOT: ICD-10-CM

## 2024-05-31 PROCEDURE — 73630 X-RAY EXAM OF FOOT: CPT

## 2024-05-31 PROCEDURE — 25010000002 FENTANYL CITRATE (PF) 100 MCG/2ML SOLUTION: Performed by: NURSE ANESTHETIST, CERTIFIED REGISTERED

## 2024-05-31 PROCEDURE — 0 BUPIVACAINE LIPOSOME 1.3 % SUSPENSION: Performed by: PODIATRIST

## 2024-05-31 PROCEDURE — 25010000002 GLYCOPYRROLATE 1 MG/5ML SOLUTION: Performed by: NURSE ANESTHETIST, CERTIFIED REGISTERED

## 2024-05-31 PROCEDURE — 25010000002 CEFAZOLIN 3 G RECONSTITUTED SOLUTION 1 EACH VIAL: Performed by: PODIATRIST

## 2024-05-31 PROCEDURE — 88305 TISSUE EXAM BY PATHOLOGIST: CPT | Performed by: PODIATRIST

## 2024-05-31 PROCEDURE — 76000 FLUOROSCOPY <1 HR PHYS/QHP: CPT

## 2024-05-31 PROCEDURE — 25010000002 PROPOFOL 200 MG/20ML EMULSION: Performed by: NURSE ANESTHETIST, CERTIFIED REGISTERED

## 2024-05-31 PROCEDURE — 88311 DECALCIFY TISSUE: CPT | Performed by: PODIATRIST

## 2024-05-31 PROCEDURE — 25810000003 LACTATED RINGERS PER 1000 ML: Performed by: NURSE ANESTHETIST, CERTIFIED REGISTERED

## 2024-05-31 PROCEDURE — C9290 INJ, BUPIVACAINE LIPOSOME: HCPCS | Performed by: PODIATRIST

## 2024-05-31 PROCEDURE — 25010000002 BUPIVACAINE (PF) 0.5 % SOLUTION 10 ML VIAL: Performed by: PODIATRIST

## 2024-05-31 PROCEDURE — 25810000003 LACTATED RINGERS PER 1000 ML: Performed by: PODIATRIST

## 2024-05-31 PROCEDURE — 25010000002 MIDAZOLAM PER 1 MG: Performed by: NURSE ANESTHETIST, CERTIFIED REGISTERED

## 2024-05-31 PROCEDURE — 25010000002 ONDANSETRON PER 1 MG: Performed by: NURSE ANESTHETIST, CERTIFIED REGISTERED

## 2024-05-31 PROCEDURE — 25010000002 LIDOCAINE 1 % SOLUTION 20 ML VIAL: Performed by: PODIATRIST

## 2024-05-31 PROCEDURE — 25010000002 DEXAMETHASONE PER 1 MG: Performed by: NURSE ANESTHETIST, CERTIFIED REGISTERED

## 2024-05-31 RX ORDER — LABETALOL HYDROCHLORIDE 5 MG/ML
5 INJECTION, SOLUTION INTRAVENOUS
Status: DISCONTINUED | OUTPATIENT
Start: 2024-05-31 | End: 2024-05-31 | Stop reason: HOSPADM

## 2024-05-31 RX ORDER — PHENYLEPHRINE HCL IN 0.9% NACL 1 MG/10 ML
SYRINGE (ML) INTRAVENOUS AS NEEDED
Status: DISCONTINUED | OUTPATIENT
Start: 2024-05-31 | End: 2024-05-31 | Stop reason: SURG

## 2024-05-31 RX ORDER — HYDRALAZINE HYDROCHLORIDE 20 MG/ML
5 INJECTION INTRAMUSCULAR; INTRAVENOUS
Status: DISCONTINUED | OUTPATIENT
Start: 2024-05-31 | End: 2024-05-31 | Stop reason: HOSPADM

## 2024-05-31 RX ORDER — ONDANSETRON 2 MG/ML
4 INJECTION INTRAMUSCULAR; INTRAVENOUS ONCE AS NEEDED
Status: DISCONTINUED | OUTPATIENT
Start: 2024-05-31 | End: 2024-05-31 | Stop reason: HOSPADM

## 2024-05-31 RX ORDER — ONDANSETRON 2 MG/ML
INJECTION INTRAMUSCULAR; INTRAVENOUS AS NEEDED
Status: DISCONTINUED | OUTPATIENT
Start: 2024-05-31 | End: 2024-05-31 | Stop reason: SURG

## 2024-05-31 RX ORDER — IPRATROPIUM BROMIDE AND ALBUTEROL SULFATE 2.5; .5 MG/3ML; MG/3ML
3 SOLUTION RESPIRATORY (INHALATION) ONCE AS NEEDED
Status: DISCONTINUED | OUTPATIENT
Start: 2024-05-31 | End: 2024-05-31 | Stop reason: HOSPADM

## 2024-05-31 RX ORDER — SODIUM CHLORIDE, SODIUM LACTATE, POTASSIUM CHLORIDE, CALCIUM CHLORIDE 600; 310; 30; 20 MG/100ML; MG/100ML; MG/100ML; MG/100ML
INJECTION, SOLUTION INTRAVENOUS CONTINUOUS PRN
Status: DISCONTINUED | OUTPATIENT
Start: 2024-05-31 | End: 2024-05-31 | Stop reason: SURG

## 2024-05-31 RX ORDER — MIDAZOLAM HYDROCHLORIDE 1 MG/ML
INJECTION INTRAMUSCULAR; INTRAVENOUS AS NEEDED
Status: DISCONTINUED | OUTPATIENT
Start: 2024-05-31 | End: 2024-05-31 | Stop reason: SURG

## 2024-05-31 RX ORDER — FLUMAZENIL 0.1 MG/ML
0.2 INJECTION INTRAVENOUS AS NEEDED
Status: DISCONTINUED | OUTPATIENT
Start: 2024-05-31 | End: 2024-05-31 | Stop reason: HOSPADM

## 2024-05-31 RX ORDER — LIDOCAINE HYDROCHLORIDE 10 MG/ML
0.5 INJECTION, SOLUTION INFILTRATION; PERINEURAL ONCE AS NEEDED
Status: DISCONTINUED | OUTPATIENT
Start: 2024-05-31 | End: 2024-05-31 | Stop reason: HOSPADM

## 2024-05-31 RX ORDER — PROPOFOL 10 MG/ML
INJECTION, EMULSION INTRAVENOUS AS NEEDED
Status: DISCONTINUED | OUTPATIENT
Start: 2024-05-31 | End: 2024-05-31 | Stop reason: SURG

## 2024-05-31 RX ORDER — OXYCODONE HYDROCHLORIDE 5 MG/1
10 TABLET ORAL EVERY 4 HOURS PRN
Status: DISCONTINUED | OUTPATIENT
Start: 2024-05-31 | End: 2024-05-31 | Stop reason: HOSPADM

## 2024-05-31 RX ORDER — SODIUM CHLORIDE 0.9 % (FLUSH) 0.9 %
10 SYRINGE (ML) INJECTION AS NEEDED
Status: DISCONTINUED | OUTPATIENT
Start: 2024-05-31 | End: 2024-05-31 | Stop reason: HOSPADM

## 2024-05-31 RX ORDER — NALOXONE HCL 0.4 MG/ML
0.4 VIAL (ML) INJECTION AS NEEDED
Status: DISCONTINUED | OUTPATIENT
Start: 2024-05-31 | End: 2024-05-31 | Stop reason: HOSPADM

## 2024-05-31 RX ORDER — DIPHENHYDRAMINE HYDROCHLORIDE 50 MG/ML
12.5 INJECTION INTRAMUSCULAR; INTRAVENOUS ONCE AS NEEDED
Status: DISCONTINUED | OUTPATIENT
Start: 2024-05-31 | End: 2024-05-31 | Stop reason: HOSPADM

## 2024-05-31 RX ORDER — FENTANYL CITRATE 50 UG/ML
INJECTION, SOLUTION INTRAMUSCULAR; INTRAVENOUS AS NEEDED
Status: DISCONTINUED | OUTPATIENT
Start: 2024-05-31 | End: 2024-05-31 | Stop reason: SURG

## 2024-05-31 RX ORDER — DEXAMETHASONE SODIUM PHOSPHATE 4 MG/ML
INJECTION, SOLUTION INTRA-ARTICULAR; INTRALESIONAL; INTRAMUSCULAR; INTRAVENOUS; SOFT TISSUE AS NEEDED
Status: DISCONTINUED | OUTPATIENT
Start: 2024-05-31 | End: 2024-05-31 | Stop reason: SURG

## 2024-05-31 RX ORDER — SODIUM CHLORIDE, SODIUM LACTATE, POTASSIUM CHLORIDE, CALCIUM CHLORIDE 600; 310; 30; 20 MG/100ML; MG/100ML; MG/100ML; MG/100ML
1000 INJECTION, SOLUTION INTRAVENOUS CONTINUOUS
Status: DISCONTINUED | OUTPATIENT
Start: 2024-05-31 | End: 2024-05-31 | Stop reason: HOSPADM

## 2024-05-31 RX ORDER — EPHEDRINE SULFATE 5 MG/ML
5 INJECTION INTRAVENOUS ONCE AS NEEDED
Status: DISCONTINUED | OUTPATIENT
Start: 2024-05-31 | End: 2024-05-31 | Stop reason: HOSPADM

## 2024-05-31 RX ORDER — OXYCODONE HYDROCHLORIDE 5 MG/1
5 TABLET ORAL ONCE AS NEEDED
Status: DISCONTINUED | OUTPATIENT
Start: 2024-05-31 | End: 2024-05-31 | Stop reason: HOSPADM

## 2024-05-31 RX ORDER — DIPHENHYDRAMINE HYDROCHLORIDE 50 MG/ML
12.5 INJECTION INTRAMUSCULAR; INTRAVENOUS
Status: DISCONTINUED | OUTPATIENT
Start: 2024-05-31 | End: 2024-05-31 | Stop reason: HOSPADM

## 2024-05-31 RX ORDER — GLYCOPYRROLATE 0.2 MG/ML
INJECTION INTRAMUSCULAR; INTRAVENOUS AS NEEDED
Status: DISCONTINUED | OUTPATIENT
Start: 2024-05-31 | End: 2024-05-31 | Stop reason: SURG

## 2024-05-31 RX ADMIN — PROPOFOL 300 MG: 10 INJECTION, EMULSION INTRAVENOUS at 07:38

## 2024-05-31 RX ADMIN — Medication 100 MCG: at 07:42

## 2024-05-31 RX ADMIN — FENTANYL CITRATE 50 MCG: 50 INJECTION, SOLUTION INTRAMUSCULAR; INTRAVENOUS at 08:04

## 2024-05-31 RX ADMIN — DEXAMETHASONE SODIUM PHOSPHATE 4 MG: 4 INJECTION, SOLUTION INTRAMUSCULAR; INTRAVENOUS at 07:48

## 2024-05-31 RX ADMIN — GLYCOPYRROLATE 0.1 MG: 0.2 INJECTION INTRAMUSCULAR; INTRAVENOUS at 07:36

## 2024-05-31 RX ADMIN — SODIUM CHLORIDE 3000 MG: 900 INJECTION INTRAVENOUS at 07:23

## 2024-05-31 RX ADMIN — FENTANYL CITRATE 50 MCG: 50 INJECTION, SOLUTION INTRAMUSCULAR; INTRAVENOUS at 07:38

## 2024-05-31 RX ADMIN — Medication 100 MCG: at 07:59

## 2024-05-31 RX ADMIN — MIDAZOLAM 2 MG: 1 INJECTION INTRAMUSCULAR; INTRAVENOUS at 07:28

## 2024-05-31 RX ADMIN — Medication 100 MCG: at 07:49

## 2024-05-31 RX ADMIN — SODIUM CHLORIDE, SODIUM LACTATE, POTASSIUM CHLORIDE, AND CALCIUM CHLORIDE: .6; .31; .03; .02 INJECTION, SOLUTION INTRAVENOUS at 07:28

## 2024-05-31 RX ADMIN — LIDOCAINE HYDROCHLORIDE 100 MG: 20 INJECTION, SOLUTION EPIDURAL; INFILTRATION; INTRACAUDAL; PERINEURAL at 07:38

## 2024-05-31 RX ADMIN — ONDANSETRON 4 MG: 2 INJECTION INTRAMUSCULAR; INTRAVENOUS at 07:48

## 2024-05-31 RX ADMIN — SODIUM CHLORIDE, POTASSIUM CHLORIDE, SODIUM LACTATE AND CALCIUM CHLORIDE 1000 ML: 600; 310; 30; 20 INJECTION, SOLUTION INTRAVENOUS at 06:40

## 2024-05-31 NOTE — ANESTHESIA POSTPROCEDURE EVALUATION
Patient: Olivier White    Procedure Summary       Date: 05/31/24 Room / Location: [facility] OR 04 / [facility] MAIN OR    Anesthesia Start: 0728 Anesthesia Stop: 0836    Procedure: EXOSTOSIS REMOVAL LEFT FOOT (Left: Foot) Diagnosis:       Hypertrophy of bone, left ankle and foot      (Hypertrophy of bone, left ankle and foot [M89.372])    Surgeons: Neftali Stevens DPM Provider: Sadia Jose CRNA    Anesthesia Type: general ASA Status: 3            Anesthesia Type: general    Vitals  Vitals Value Taken Time   /67 05/31/24 0855   Temp 97.4 °F (36.3 °C) 05/31/24 0831   Pulse 67 05/31/24 0859   Resp 12 05/31/24 0855   SpO2 98 % 05/31/24 0859   Vitals shown include unfiled device data.        Post Anesthesia Care and Evaluation    Patient location during evaluation: PACU  Patient participation: complete - patient participated  Level of consciousness: awake  Pain scale: See nurse's notes for pain score.  Pain management: adequate    Airway patency: patent  Anesthetic complications: No anesthetic complications  PONV Status: none  Cardiovascular status: acceptable  Respiratory status: acceptable and spontaneous ventilation  Hydration status: acceptable    Comments: Patient seen and examined postoperatively; vital signs stable; SpO2 greater than or equal to 90%; cardiopulmonary status stable; nausea/vomiting adequately controlled; pain adequately controlled; no apparent anesthesia complications; patient discharged from anesthesia care when discharge criteria were met

## 2024-05-31 NOTE — ANESTHESIA PROCEDURE NOTES
Airway  Urgency: elective    Date/Time: 5/31/2024 7:39 AM  Airway not difficult    General Information and Staff    Patient location during procedure: OR  CRNA/CAA: Sadia Jose CRNA    Indications and Patient Condition  Indications for airway management: airway protection    Preoxygenated: yes  Mask difficulty assessment: 0 - not attempted    Final Airway Details  Final airway type: supraglottic airway      Successful airway: I-gel  Size 5     Number of attempts at approach: 1  Assessment: lips, teeth, and gum same as pre-op and atraumatic intubation

## 2024-05-31 NOTE — OP NOTE
TOE EXOSTOSIS EXCISION  Procedure Report    Patient Name:  Olivier White  YOB: 1960    Date of Surgery:  5/31/2024     Indications: Recurrent ulceration left foot secondary to exostosis    Pre-op Diagnosis:   Hypertrophy of bone, left ankle and foot [M89.372]       Post-Op Diagnosis Codes:     * Hypertrophy of bone, left ankle and foot [M89.372]    Procedure/CPT® Codes:      Procedure(s):  EXOSTOSIS REMOVAL LEFT FOOT    Staff:  Surgeon(s):  Neftali Stevens DPM Lerchner, Jay, DPM PGY-3         Anesthesiologist: Sadia Jose CRNA    Anesthesia: General    Estimated Blood Loss: minimal    Implants:    Nothing was implanted during the procedure        Findings: Significant exostosis noted around the medial cuneiform of the left foot    Complications: None    Description of Procedure: Patient is a pleasant 64-year-old male that has had numerous problems with his left foot from a tibialis anterior tear that was repaired to degenerative changes.  Because of the degenerative changes he developed hypertrophy of the bone around the medial cuneiform bone and developed a pressure ulcer secondary to this.  Shoe gear changes padding and numerous things have been done to try to relieve this and he would always heal but eventually rebreakdown.  Patient was given the risk of surgery which include but not limited to bleeding infection nerve damage loss of limb reoccurrence of the exostosis and potential wound loss of life.  All questions were answered for the patient no guarantees given or implied about the outcome of the procedure.  A consent was signed and placed in the chart.     Patient was brought back to the operating room placed on operating table supine position where pneumatic ankle tourniquet was placed about the patient's left ankle.  Once when general anesthesia was achieved left foot was anesthetized with an equal mixture of 20 cc of half percent Marcaine plain and 1% lidocaine plain.  A timeout  had been performed prior to the injection and all were in agreement.  Left foot was then scrubbed prepped and draped in usual aseptic manner the limb was then elevated exsanguinated pneumatic ankle tourniquet was then inflated to 250 mm of mike    Exostosis removal left foot.    Attention was then directed to the medial aspect on the left foot where a 6 cm incision was made above the exostosis.  Incision was deepened down through subcutaneous tissue with care being taken retract all vital neurovascular structures.  The exostosis was identified and the periosteum was resected from it in the exostosis was fully exposed all of all its soft tissue attachment.  At this point utilizing a sagittal saw a significant portion of the exostosis was removed.  This was then freed of all its remaining soft tissue attachments and passed from the operating site and sent down to pathology for examination.  Pressure on the area where he is breaking down was noted to be still a little prominence noted.  At this time utilizing a pineapple bur this area was burred down C arm shots were taken where we did see the prominence second C arm shot was taken after burning down and it was noted to have been resected.  Pressure now at the breakdown spot showed significant decompression.  At this point all areas were noted to be smooth and free of any sharp prominences.  The wound was then flushed with copious amounts of normal sterile saline.  Tourniquet was then deflated and prompt hyperemic spots was noted to all digits on the left foot.  Closure consisted of 2-0 Vicryl for the deep closure and 3-0 nylon for the skin.  Dressings consisted of Betadine Adaptic 4 x 4's ABD pad Curlex cast padding posterior splint and Ace bandages.  Patient tolerated procedure and anesthesia well patient transferred to the operating room cover room vital signs stable neurovascular status intact all digits on left foot.  After short time postoperative monitoring the  patient was released and sent home with written oral postoperative instructions he will be nonweightbearing.  Signs and symptoms of DVT were discussed and Xarelto he can start tomorrow morning.  Any problems he is to call the office. He has a follow-up on Monday for dressing change.      Neftali Stevens DPM     Date: 5/31/2024  Time: 08:28 EDT

## 2024-06-03 ENCOUNTER — TELEPHONE (OUTPATIENT)
Dept: ORTHOPEDIC SURGERY | Facility: CLINIC | Age: 64
End: 2024-06-03
Payer: MEDICARE

## 2024-06-03 LAB
LAB AP CASE REPORT: NORMAL
PATH REPORT.FINAL DX SPEC: NORMAL
PATH REPORT.GROSS SPEC: NORMAL

## 2024-06-03 NOTE — TELEPHONE ENCOUNTER
Pt experiencing a rash on right leg that started after sx with abeln. Spoke with jose and pt should try otc benadryl and be seen in office with Jose Hope on Tuesday 06/04/2024.

## 2024-06-04 ENCOUNTER — OFFICE VISIT (OUTPATIENT)
Dept: ORTHOPEDIC SURGERY | Facility: CLINIC | Age: 64
End: 2024-06-04
Payer: MEDICARE

## 2024-06-04 VITALS — BODY MASS INDEX: 33.18 KG/M2 | OXYGEN SATURATION: 97 % | HEIGHT: 78 IN | HEART RATE: 68 BPM | WEIGHT: 286.8 LBS

## 2024-06-04 DIAGNOSIS — Z47.89 ORTHOPEDIC AFTERCARE: Primary | ICD-10-CM

## 2024-06-04 PROCEDURE — 99024 POSTOP FOLLOW-UP VISIT: CPT | Performed by: PHYSICIAN ASSISTANT

## 2024-06-04 NOTE — PROGRESS NOTES
"   Patient ID: Olivier White is a 64 y.o. male presents wife, Liborio, for follow up on right knee patellar bursa excision on 5/24/2024 after experiencing a eruption pattern over the prepped area of the RLE.  Reports this dermatologic eruption was very pruritic.  He trimmed the bandage below the surgical site and just proximal to it to get air to the skin.  States this has relieved the pruritus but the patches remain.  Denies any streaking nor signs of infection.  Reports taking Benadryl.    Objective:  Pulse 68   Ht 200.7 cm (79\")   Wt 130 kg (286 lb 12.8 oz)   SpO2 97%   BMI 32.31 kg/m²     Physical Examination:    Right lower extremity/knee:  Numerous erythematous patches, better nonblanchable, over the right lower extremity from mid thigh to ankle.  This is most notable over the anterior aspects.  See photo in media  Range of motion to the hip and ankle grossly intact  Negative calf tenderness    Imaging:   No new imaging    Assessment:    Diagnoses and all orders for this visit:    1. Orthopedic aftercare (Primary)    Plan: Recommend cleansing the skin of the ChloraPrep that remains on his skin.  Not to saturate his bandage over the knee.  May apply hydrocortisone cream if pruritus is continued.  Recommend the patient maintain the bandage in place until follow-up with Dr. Mendieta    Disclaimer: Part of this note may be an electronic transcription/translation of spoken language to printed text using the Dragon Dictation System  Answers submitted by the patient for this visit:  Primary Reason for Visit (Submitted on 6/3/2024)  What is the primary reason for your visit?: Other  Other (Submitted on 6/3/2024)  Please describe your symptoms.: Follow up after surgery  Have you had these symptoms before?: No  How long have you been having these symptoms?: 1-2 weeks    "

## 2024-06-06 ENCOUNTER — OFFICE VISIT (OUTPATIENT)
Dept: ORTHOPEDIC SURGERY | Facility: CLINIC | Age: 64
End: 2024-06-06
Payer: MEDICARE

## 2024-06-06 VITALS — HEIGHT: 78 IN | BODY MASS INDEX: 33.09 KG/M2 | HEART RATE: 81 BPM | WEIGHT: 286 LBS

## 2024-06-06 DIAGNOSIS — Z47.89 ORTHOPEDIC AFTERCARE: Primary | ICD-10-CM

## 2024-06-06 PROCEDURE — 99024 POSTOP FOLLOW-UP VISIT: CPT | Performed by: ORTHOPAEDIC SURGERY

## 2024-06-06 NOTE — PROGRESS NOTES
"     Patient ID: Olivier White is a 64 y.o. male.  5/24/24 right patella bursa excision  Pain control is developed a dermatitis      Objective:    Pulse 81   Ht 200.7 cm (79\")   Wt 130 kg (286 lb)   BMI 32.22 kg/m²     Physical Examination:       Right knee demonstrates well-approximated incision has dermatitis essentially over the entire area of the leg corresponding to the skin prep.  No sign of infection.  Srinath negative  Sensory and motor exam are intact in all distributions. Dorsalis pedis and posterior tibialis pulses are palpable and capillary refill is less than two seconds to all digits.  Imaging:  Pathology consistent with benign bursal inflammatory tissue    Assessment:  Dermatitis after bursa excision    Plan:  Staples were removed he can shower hold off of kneeling and deep squatting for 2 weeks if the Ace bandage causes skin irritation can remove otherwise wear that for 2 weeks as well as see me as needed  "

## 2024-06-19 DIAGNOSIS — I10 HYPERTENSION, UNSPECIFIED TYPE: ICD-10-CM

## 2024-06-19 RX ORDER — LOSARTAN POTASSIUM 50 MG/1
50 TABLET ORAL DAILY
Qty: 90 TABLET | Refills: 3 | Status: SHIPPED | OUTPATIENT
Start: 2024-06-19

## 2024-06-21 ENCOUNTER — HOSPITAL ENCOUNTER (OUTPATIENT)
Dept: INTERVENTIONAL RADIOLOGY/VASCULAR | Facility: HOSPITAL | Age: 64
Discharge: HOME | End: 2024-06-21
Payer: MEDICARE

## 2024-06-21 VITALS — BODY MASS INDEX: 32.4 KG/M2 | HEIGHT: 78 IN | WEIGHT: 280 LBS

## 2024-06-21 DIAGNOSIS — R22.1 NECK MASS: ICD-10-CM

## 2024-06-21 PROCEDURE — 25010000002 LIDOCAINE 1 % SOLUTION

## 2024-06-21 PROCEDURE — 76942 ECHO GUIDE FOR BIOPSY: CPT

## 2024-06-21 RX ORDER — LIDOCAINE HYDROCHLORIDE 10 MG/ML
INJECTION, SOLUTION INFILTRATION; PERINEURAL AS NEEDED
Status: COMPLETED | OUTPATIENT
Start: 2024-06-21 | End: 2024-06-21

## 2024-06-21 RX ADMIN — LIDOCAINE HYDROCHLORIDE 2 ML: 10 INJECTION, SOLUTION INFILTRATION; PERINEURAL at 10:26

## 2024-06-21 RX ADMIN — LIDOCAINE HYDROCHLORIDE 4 ML: 10 INJECTION, SOLUTION INFILTRATION; PERINEURAL at 10:22

## 2024-06-21 NOTE — DISCHARGE INSTRUCTIONS
KEEP DRESSING ON FOR THE NEXT 48 HOURS. YOU MAY THEN REMOVE IT AND SHOWER AS USUAL,, JUST DON'T SCRUB THE AREA. NO HEAVY LIFTING GREATER THAN 10 POUNDS FOR THE NEXT 24 HOURS. WATCH FOR SIGNS AND SYMPTOMS OF INFECTION SUCH AS REDNESS, SWELLING, DRAINAGE, OR UNEXPLAINED FEVER. IF YOU DEVELOP ANY DIFFICULTY SWALLOWING OR BREATHING, GO TO NEAREST ER FOR EVALUATION. IF ANY SYMPTOMS ARISE OF INFECTION, SEE YOUR PHYSICIAN. FOLLOW UP WITH YOUR ORDERING PHYSICIAN FOR YOUR BIOPSY RESULTS IN A FEW DAYS. ANY QUESTIONS, YOU CAN CALL THE [facility] DEPT [phone], M-F 8-4:30. IF AFTER HOURS, FOLLOW PROMPTS ON PHONE LINE.

## 2024-06-23 LAB — Lab: NORMAL

## 2024-06-24 LAB
LAB AP CASE REPORT: NORMAL
LAB AP DIAGNOSIS COMMENT: NORMAL
LAB AP FLOW CYTOMETRY SUMMARY: NORMAL
PATH REPORT.FINAL DX SPEC: NORMAL
PATH REPORT.GROSS SPEC: NORMAL

## 2024-09-16 RX ORDER — PROMETHAZINE HYDROCHLORIDE 12.5 MG/1
12.5 TABLET ORAL EVERY 6 HOURS PRN
Qty: 21 TABLET | Refills: 1 | OUTPATIENT
Start: 2024-09-16

## 2024-09-26 ENCOUNTER — LAB (OUTPATIENT)
Dept: FAMILY MEDICINE CLINIC | Facility: CLINIC | Age: 64
End: 2024-09-26
Payer: MEDICARE

## 2024-09-26 ENCOUNTER — OFFICE VISIT (OUTPATIENT)
Dept: FAMILY MEDICINE CLINIC | Facility: CLINIC | Age: 64
End: 2024-09-26
Payer: MEDICARE

## 2024-09-26 VITALS
RESPIRATION RATE: 18 BRPM | WEIGHT: 289.8 LBS | HEART RATE: 52 BPM | DIASTOLIC BLOOD PRESSURE: 76 MMHG | SYSTOLIC BLOOD PRESSURE: 114 MMHG | BODY MASS INDEX: 33.53 KG/M2 | HEIGHT: 78 IN | OXYGEN SATURATION: 98 %

## 2024-09-26 DIAGNOSIS — Z00.00 MEDICARE ANNUAL WELLNESS VISIT, INITIAL: Primary | ICD-10-CM

## 2024-09-26 DIAGNOSIS — Z00.00 MEDICARE ANNUAL WELLNESS VISIT, INITIAL: ICD-10-CM

## 2024-09-26 DIAGNOSIS — I10 ESSENTIAL HYPERTENSION: ICD-10-CM

## 2024-09-26 DIAGNOSIS — Z87.898 HISTORY OF MOTION SICKNESS: ICD-10-CM

## 2024-09-26 DIAGNOSIS — Z12.5 SCREENING PSA (PROSTATE SPECIFIC ANTIGEN): ICD-10-CM

## 2024-09-26 DIAGNOSIS — R05.8 OTHER COUGH: ICD-10-CM

## 2024-09-26 DIAGNOSIS — Z23 ENCOUNTER FOR ADMINISTRATION OF VACCINE: ICD-10-CM

## 2024-09-26 LAB
ALBUMIN SERPL-MCNC: 4 G/DL (ref 3.5–5.2)
ALBUMIN/GLOB SERPL: 1.3 G/DL
ALP SERPL-CCNC: 66 U/L (ref 39–117)
ALT SERPL W P-5'-P-CCNC: 40 U/L (ref 1–41)
ANION GAP SERPL CALCULATED.3IONS-SCNC: 9 MMOL/L (ref 5–15)
AST SERPL-CCNC: 28 U/L (ref 1–40)
BILIRUB SERPL-MCNC: 2 MG/DL (ref 0–1.2)
BUN SERPL-MCNC: 17 MG/DL (ref 8–23)
BUN/CREAT SERPL: 17.9 (ref 7–25)
CALCIUM SPEC-SCNC: 9.3 MG/DL (ref 8.6–10.5)
CHLORIDE SERPL-SCNC: 104 MMOL/L (ref 98–107)
CHOLEST SERPL-MCNC: 171 MG/DL (ref 0–200)
CO2 SERPL-SCNC: 26 MMOL/L (ref 22–29)
CREAT SERPL-MCNC: 0.95 MG/DL (ref 0.76–1.27)
DEPRECATED RDW RBC AUTO: 42.5 FL (ref 37–54)
EGFRCR SERPLBLD CKD-EPI 2021: 88.3 ML/MIN/1.73
ERYTHROCYTE [DISTWIDTH] IN BLOOD BY AUTOMATED COUNT: 12.4 % (ref 12.3–15.4)
GLOBULIN UR ELPH-MCNC: 3 GM/DL
GLUCOSE SERPL-MCNC: 96 MG/DL (ref 65–99)
HCT VFR BLD AUTO: 47 % (ref 37.5–51)
HDLC SERPL-MCNC: 35 MG/DL (ref 40–60)
HGB BLD-MCNC: 16.4 G/DL (ref 13–17.7)
LDLC SERPL CALC-MCNC: 117 MG/DL (ref 0–100)
LDLC/HDLC SERPL: 3.29 {RATIO}
MCH RBC QN AUTO: 32.7 PG (ref 26.6–33)
MCHC RBC AUTO-ENTMCNC: 34.9 G/DL (ref 31.5–35.7)
MCV RBC AUTO: 93.6 FL (ref 79–97)
PLATELET # BLD AUTO: 197 10*3/MM3 (ref 140–450)
PMV BLD AUTO: 9.1 FL (ref 6–12)
POTASSIUM SERPL-SCNC: 4.1 MMOL/L (ref 3.5–5.2)
PROT SERPL-MCNC: 7 G/DL (ref 6–8.5)
PSA SERPL-MCNC: 1.94 NG/ML (ref 0–4)
RBC # BLD AUTO: 5.02 10*6/MM3 (ref 4.14–5.8)
SODIUM SERPL-SCNC: 139 MMOL/L (ref 136–145)
TRIGL SERPL-MCNC: 105 MG/DL (ref 0–150)
VLDLC SERPL-MCNC: 19 MG/DL (ref 5–40)
WBC NRBC COR # BLD AUTO: 6.28 10*3/MM3 (ref 3.4–10.8)

## 2024-09-26 PROCEDURE — G0103 PSA SCREENING: HCPCS | Performed by: NURSE PRACTITIONER

## 2024-09-26 PROCEDURE — G0402 INITIAL PREVENTIVE EXAM: HCPCS | Performed by: NURSE PRACTITIONER

## 2024-09-26 PROCEDURE — 3078F DIAST BP <80 MM HG: CPT | Performed by: NURSE PRACTITIONER

## 2024-09-26 PROCEDURE — 80061 LIPID PANEL: CPT | Performed by: NURSE PRACTITIONER

## 2024-09-26 PROCEDURE — 85027 COMPLETE CBC AUTOMATED: CPT | Performed by: NURSE PRACTITIONER

## 2024-09-26 PROCEDURE — 90662 IIV NO PRSV INCREASED AG IM: CPT | Performed by: NURSE PRACTITIONER

## 2024-09-26 PROCEDURE — G0008 ADMIN INFLUENZA VIRUS VAC: HCPCS | Performed by: NURSE PRACTITIONER

## 2024-09-26 PROCEDURE — 1160F RVW MEDS BY RX/DR IN RCRD: CPT | Performed by: NURSE PRACTITIONER

## 2024-09-26 PROCEDURE — 36415 COLL VENOUS BLD VENIPUNCTURE: CPT

## 2024-09-26 PROCEDURE — 1126F AMNT PAIN NOTED NONE PRSNT: CPT | Performed by: NURSE PRACTITIONER

## 2024-09-26 PROCEDURE — 1159F MED LIST DOCD IN RCRD: CPT | Performed by: NURSE PRACTITIONER

## 2024-09-26 PROCEDURE — 80053 COMPREHEN METABOLIC PANEL: CPT | Performed by: NURSE PRACTITIONER

## 2024-09-26 PROCEDURE — 93000 ELECTROCARDIOGRAM COMPLETE: CPT | Performed by: NURSE PRACTITIONER

## 2024-09-26 PROCEDURE — 3074F SYST BP LT 130 MM HG: CPT | Performed by: NURSE PRACTITIONER

## 2024-09-26 RX ORDER — MECLIZINE HCL 12.5 MG 12.5 MG/1
12.5 TABLET ORAL 3 TIMES DAILY PRN
Qty: 60 TABLET | Refills: 0 | Status: SHIPPED | OUTPATIENT
Start: 2024-09-26

## 2024-09-26 RX ORDER — TAMSULOSIN HYDROCHLORIDE 0.4 MG/1
1 CAPSULE ORAL NIGHTLY
Qty: 90 CAPSULE | Refills: 3 | Status: SHIPPED | OUTPATIENT
Start: 2024-09-26

## 2024-09-26 RX ORDER — BENZONATATE 200 MG/1
200 CAPSULE ORAL 3 TIMES DAILY PRN
COMMUNITY
End: 2024-09-26 | Stop reason: SDUPTHER

## 2024-09-26 RX ORDER — MECLIZINE HCL 12.5 MG 12.5 MG/1
12.5 TABLET ORAL 3 TIMES DAILY PRN
COMMUNITY
End: 2024-09-26 | Stop reason: SDUPTHER

## 2024-09-26 RX ORDER — BENZONATATE 200 MG/1
200 CAPSULE ORAL 3 TIMES DAILY PRN
Qty: 30 CAPSULE | Refills: 0 | Status: SHIPPED | OUTPATIENT
Start: 2024-09-26

## 2024-10-01 ENCOUNTER — PATIENT ROUNDING (BHMG ONLY) (OUTPATIENT)
Dept: FAMILY MEDICINE CLINIC | Facility: CLINIC | Age: 64
End: 2024-10-01
Payer: MEDICARE

## 2024-10-01 NOTE — PROGRESS NOTES
October 1, 2024      A [employer] message was sent to Olivier White inquiring about patient's experience at our office during a recent visit.      CALEB [place]  [facility]  [address]  [place] [id number].

## 2024-12-20 DIAGNOSIS — I10 ESSENTIAL HYPERTENSION: ICD-10-CM

## 2024-12-20 RX ORDER — ATENOLOL 50 MG/1
50 TABLET ORAL 2 TIMES DAILY
Qty: 180 TABLET | Refills: 3 | Status: SHIPPED | OUTPATIENT
Start: 2024-12-20 | End: 2024-12-20 | Stop reason: SDUPTHER

## 2024-12-22 RX ORDER — ATENOLOL 50 MG/1
50 TABLET ORAL 2 TIMES DAILY
Qty: 180 TABLET | Refills: 3 | Status: SHIPPED | OUTPATIENT
Start: 2024-12-22

## 2025-02-21 ENCOUNTER — PATIENT MESSAGE (OUTPATIENT)
Dept: FAMILY MEDICINE CLINIC | Facility: CLINIC | Age: 65
End: 2025-02-21
Payer: MEDICARE

## 2025-02-21 DIAGNOSIS — G47.30 SLEEP APNEA, UNSPECIFIED TYPE: Primary | ICD-10-CM

## 2025-02-25 RX ORDER — PROMETHAZINE HYDROCHLORIDE 12.5 MG/1
12.5 TABLET ORAL EVERY 6 HOURS PRN
Qty: 21 TABLET | Refills: 1 | OUTPATIENT
Start: 2025-02-25

## 2025-03-11 ENCOUNTER — TELEPHONE (OUTPATIENT)
Dept: FAMILY MEDICINE CLINIC | Facility: CLINIC | Age: 65
End: 2025-03-11
Payer: MEDICARE

## 2025-03-11 NOTE — TELEPHONE ENCOUNTER
Caller: Olivier White    Relationship to patient: Self    Best call back number: [phone]    Patient is needing:   ASKING FOR HIS ORDER FOR A NEW CPAP MACHINE BE RE-FAXED TO [facility] USING A DIFFERENT NUMBER. THEY DID NOT RECEIVE IT.    FAX: [phone]  (ATTENTION SHAGGY)

## 2025-03-26 ENCOUNTER — OFFICE VISIT (OUTPATIENT)
Dept: FAMILY MEDICINE CLINIC | Facility: CLINIC | Age: 65
End: 2025-03-26
Payer: MEDICARE

## 2025-03-26 ENCOUNTER — LAB (OUTPATIENT)
Dept: FAMILY MEDICINE CLINIC | Facility: CLINIC | Age: 65
End: 2025-03-26
Payer: MEDICARE

## 2025-03-26 VITALS
HEIGHT: 78 IN | SYSTOLIC BLOOD PRESSURE: 136 MMHG | DIASTOLIC BLOOD PRESSURE: 82 MMHG | OXYGEN SATURATION: 100 % | BODY MASS INDEX: 33.67 KG/M2 | RESPIRATION RATE: 16 BRPM | HEART RATE: 56 BPM | WEIGHT: 291 LBS

## 2025-03-26 DIAGNOSIS — R17 ELEVATED BILIRUBIN: ICD-10-CM

## 2025-03-26 DIAGNOSIS — I10 ESSENTIAL HYPERTENSION: Primary | ICD-10-CM

## 2025-03-26 DIAGNOSIS — R05.8 OTHER COUGH: ICD-10-CM

## 2025-03-26 DIAGNOSIS — G47.30 SLEEP APNEA, UNSPECIFIED TYPE: ICD-10-CM

## 2025-03-26 DIAGNOSIS — I10 ESSENTIAL HYPERTENSION: ICD-10-CM

## 2025-03-26 LAB
ALBUMIN SERPL-MCNC: 4.1 G/DL (ref 3.5–5.2)
ALBUMIN/GLOB SERPL: 1.4 G/DL
ALP SERPL-CCNC: 60 U/L (ref 39–117)
ALT SERPL W P-5'-P-CCNC: 42 U/L (ref 1–41)
ANION GAP SERPL CALCULATED.3IONS-SCNC: 10 MMOL/L (ref 5–15)
AST SERPL-CCNC: 36 U/L (ref 1–40)
BILIRUB CONJ SERPL-MCNC: 0.6 MG/DL (ref 0–0.3)
BILIRUB SERPL-MCNC: 1.9 MG/DL (ref 0–1.2)
BUN SERPL-MCNC: 15 MG/DL (ref 8–23)
BUN/CREAT SERPL: 14.7 (ref 7–25)
CALCIUM SPEC-SCNC: 9.4 MG/DL (ref 8.6–10.5)
CHLORIDE SERPL-SCNC: 108 MMOL/L (ref 98–107)
CO2 SERPL-SCNC: 24 MMOL/L (ref 22–29)
CREAT SERPL-MCNC: 1.02 MG/DL (ref 0.76–1.27)
EGFRCR SERPLBLD CKD-EPI 2021: 80.6 ML/MIN/1.73
GLOBULIN UR ELPH-MCNC: 3 GM/DL
GLUCOSE SERPL-MCNC: 97 MG/DL (ref 65–99)
POTASSIUM SERPL-SCNC: 4 MMOL/L (ref 3.5–5.2)
PROT SERPL-MCNC: 7.1 G/DL (ref 6–8.5)
SODIUM SERPL-SCNC: 142 MMOL/L (ref 136–145)

## 2025-03-26 PROCEDURE — 1159F MED LIST DOCD IN RCRD: CPT | Performed by: NURSE PRACTITIONER

## 2025-03-26 PROCEDURE — 3079F DIAST BP 80-89 MM HG: CPT | Performed by: NURSE PRACTITIONER

## 2025-03-26 PROCEDURE — 82248 BILIRUBIN DIRECT: CPT | Performed by: NURSE PRACTITIONER

## 2025-03-26 PROCEDURE — 3075F SYST BP GE 130 - 139MM HG: CPT | Performed by: NURSE PRACTITIONER

## 2025-03-26 PROCEDURE — 1160F RVW MEDS BY RX/DR IN RCRD: CPT | Performed by: NURSE PRACTITIONER

## 2025-03-26 PROCEDURE — 36415 COLL VENOUS BLD VENIPUNCTURE: CPT

## 2025-03-26 PROCEDURE — 80053 COMPREHEN METABOLIC PANEL: CPT | Performed by: NURSE PRACTITIONER

## 2025-03-26 PROCEDURE — 1126F AMNT PAIN NOTED NONE PRSNT: CPT | Performed by: NURSE PRACTITIONER

## 2025-03-26 PROCEDURE — 99213 OFFICE O/P EST LOW 20 MIN: CPT | Performed by: NURSE PRACTITIONER

## 2025-03-26 RX ORDER — BENZONATATE 200 MG/1
200 CAPSULE ORAL 3 TIMES DAILY PRN
Qty: 30 CAPSULE | Refills: 1 | Status: SHIPPED | OUTPATIENT
Start: 2025-03-26

## 2025-03-26 NOTE — PROGRESS NOTES
Chief Complaint  Med Management (6 month f/u on meds. Sleep apnea. )    Subjective          Olivier White presents to [facility]    Follow up on blood pressure medication. Sleep Apnea review.  Onset was at an unknown time.   Pertinent negative symptoms include no abdominal pain, no anorexia, no joint pain, no change in stool, no chest pain, no chills, no congestion, no cough, no diaphoresis, no fatigue, no fever, no headaches, no joint swelling, no myalgias, no nausea, no neck pain, no numbness, no rash, no sore throat, no swollen glands, no dysuria, no vertigo, no visual change, no vomiting and no weakness.   Treatment and/or Medications comments include: Atenelol, losartan   Additional information: Review of medications.    Is here today for follow up HTN    BP is currently managed with atenolol 50 mg bid, losartan 50 mg qd    Has h/o sleep apnea, has been using a CPAP machine for 15 years  He endorses that he sleeps well with the CPAP, does not snore when using, and also does not wake up with headaches  CPAP is beneficial, and plan is to continue using  He does need a new machine at this time, has requested through [employer]  His current machine is giving him error codes and not operating correctly    Diet is reported to be fair    Exercises some    Review of Systems   Constitutional: Negative.  Negative for chills, diaphoresis, fatigue and fever.   HENT: Negative.  Negative for congestion and sore throat.    Respiratory: Negative.  Negative for cough.    Cardiovascular: Negative.  Negative for chest pain.   Gastrointestinal: Negative.  Negative for abdominal pain, anorexia, nausea and vomiting.   Genitourinary: Negative.  Negative for dysuria.   Musculoskeletal: Negative.  Negative for joint pain, myalgias and neck pain.   Skin: Negative.  Negative for rash.   Neurological: Negative.  Negative for vertigo, weakness, numbness and headaches.     Objective   Vital Signs:  BP  "136/82 (BP Location: Right arm, Patient Position: Sitting)   Pulse 56   Resp 16   Ht 200.7 cm (79\")   Wt 132 kg (291 lb)   SpO2 100%   BMI 32.78 kg/m²     BP Readings from Last 3 Encounters:   03/26/25 136/82   09/26/24 114/76   05/31/24 128/79        Wt Readings from Last 3 Encounters:   03/26/25 132 kg (291 lb)   09/26/24 131 kg (289 lb 12.8 oz)   06/21/24 127 kg (280 lb)        BMI is >= 30 and <35. (Class 1 Obesity). The following options were offered after discussion;: exercise counseling/recommendations and nutrition counseling/recommendations      Physical Exam  Vitals reviewed.   Constitutional:       Appearance: Normal appearance. He is obese.   Neck:      Vascular: No carotid bruit.   Cardiovascular:      Rate and Rhythm: Normal rate and regular rhythm.      Pulses: Normal pulses.      Heart sounds: Normal heart sounds.   Pulmonary:      Effort: Pulmonary effort is normal.      Breath sounds: Normal breath sounds.   Musculoskeletal:      Cervical back: Neck supple.      Right lower leg: No edema.      Left lower leg: No edema.   Skin:     General: Skin is warm.   Neurological:      Mental Status: He is alert and oriented to person, place, and time.   Psychiatric:         Mood and Affect: Mood normal.        Result Review :     CMP          5/16/2024    10:11 9/26/2024    09:52   CMP   Glucose 102  96    BUN 12  17    Creatinine 1.07  0.95    EGFR 76.5  88.3    Sodium 142  139    Potassium 3.9  4.1    Chloride 105  104    Calcium 9.1  9.3    Total Protein  7.0    Albumin  4.0    Globulin  3.0    Total Bilirubin  2.0    Alkaline Phosphatase  66    AST (SGOT)  28    ALT (SGPT)  40    Albumin/Globulin Ratio  1.3    BUN/Creatinine Ratio 11.2  17.9    Anion Gap 11.0  9.0                Assessment and Plan    Diagnoses and all orders for this visit:    1. Essential hypertension (Primary)  -     Comprehensive metabolic panel; Future    2. Elevated bilirubin  -     Bilirubin, Total & Direct; Future    3. " Other cough  -     benzonatate (TESSALON) 200 MG capsule; Take 1 capsule by mouth 3 (Three) Times a Day As Needed for Cough.  Dispense: 30 capsule; Refill: 1    4. Sleep apnea, unspecified type    Continue CPAP use         Follow Up   Return in about 6 months (around 9/26/2025) for Medicare Wellness.  Patient was given instructions and counseling regarding his condition or for health maintenance advice. Please see specific information pulled into the AVS if appropriate.

## 2025-05-29 DIAGNOSIS — R05.8 OTHER COUGH: ICD-10-CM

## 2025-05-29 RX ORDER — BENZONATATE 200 MG/1
CAPSULE ORAL
Qty: 30 CAPSULE | Refills: 1 | Status: SHIPPED | OUTPATIENT
Start: 2025-05-29

## 2025-06-19 DIAGNOSIS — I10 HYPERTENSION, UNSPECIFIED TYPE: ICD-10-CM

## 2025-06-19 RX ORDER — LOSARTAN POTASSIUM 50 MG/1
50 TABLET ORAL DAILY
Qty: 90 TABLET | Refills: 1 | Status: SHIPPED | OUTPATIENT
Start: 2025-06-19

## 2025-06-25 ENCOUNTER — OFFICE VISIT (OUTPATIENT)
Dept: FAMILY MEDICINE CLINIC | Facility: CLINIC | Age: 65
End: 2025-06-25
Payer: MEDICARE

## 2025-06-25 VITALS
HEIGHT: 78 IN | DIASTOLIC BLOOD PRESSURE: 84 MMHG | BODY MASS INDEX: 33.44 KG/M2 | SYSTOLIC BLOOD PRESSURE: 132 MMHG | OXYGEN SATURATION: 97 % | HEART RATE: 50 BPM | RESPIRATION RATE: 20 BRPM | WEIGHT: 289 LBS

## 2025-06-25 DIAGNOSIS — G47.30 SLEEP APNEA, UNSPECIFIED TYPE: Primary | ICD-10-CM

## 2025-06-25 PROCEDURE — 1160F RVW MEDS BY RX/DR IN RCRD: CPT | Performed by: NURSE PRACTITIONER

## 2025-06-25 PROCEDURE — 3075F SYST BP GE 130 - 139MM HG: CPT | Performed by: NURSE PRACTITIONER

## 2025-06-25 PROCEDURE — 99213 OFFICE O/P EST LOW 20 MIN: CPT | Performed by: NURSE PRACTITIONER

## 2025-06-25 PROCEDURE — 1159F MED LIST DOCD IN RCRD: CPT | Performed by: NURSE PRACTITIONER

## 2025-06-25 PROCEDURE — 3079F DIAST BP 80-89 MM HG: CPT | Performed by: NURSE PRACTITIONER

## 2025-06-25 PROCEDURE — 1126F AMNT PAIN NOTED NONE PRSNT: CPT | Performed by: NURSE PRACTITIONER

## 2025-06-25 NOTE — PROGRESS NOTES
"Chief Complaint  Fatigue (Discuss cpap)    Subjective          Olivier White presents to [facility]  Fatigue  Symptoms: no chest pain, no fatigue, no headaches and no weakness      Follow up on CPAP  Pertinent negative symptoms include no chest pain, no fatigue, no headaches and no weakness.   Additional information: Medicare require me to follow up from start of CPAP    Is here today for follow-up on sleep apnea, he has recently replaced his CPAP machine and is not followed by a sleep medicine provider    He endorses that his new machine is working well, it is monitored by the company that makes it, he tells me that all reports are good    He tells me that he is able to wear it all night without any problem    Review of Systems   Constitutional: Negative.  Negative for activity change, appetite change and fatigue.   Respiratory: Negative.  Negative for shortness of breath.    Cardiovascular: Negative.  Negative for chest pain.   Neurological: Negative.  Negative for dizziness, weakness and headaches.   Psychiatric/Behavioral: Negative.  Negative for sleep disturbance.      Objective   Vital Signs:  /84 (BP Location: Left arm, Patient Position: Sitting)   Pulse 50   Resp 20   Ht 200.7 cm (79.02\")   Wt 131 kg (289 lb)   SpO2 97%   BMI 32.54 kg/m²     BP Readings from Last 3 Encounters:   06/25/25 132/84   03/26/25 136/82   09/26/24 114/76        Wt Readings from Last 3 Encounters:   06/25/25 131 kg (289 lb)   03/26/25 132 kg (291 lb)   09/26/24 131 kg (289 lb 12.8 oz)      Physical Exam  Vitals reviewed.   Constitutional:       Appearance: Normal appearance.   Cardiovascular:      Rate and Rhythm: Normal rate and regular rhythm.      Heart sounds: Normal heart sounds.   Pulmonary:      Effort: Pulmonary effort is normal.      Breath sounds: Normal breath sounds.   Musculoskeletal:      Right lower leg: No edema.      Left lower leg: No edema.   Skin:     General: Skin " is warm.   Neurological:      Mental Status: He is alert and oriented to person, place, and time.        Result Review :                 Assessment and Plan    Diagnoses and all orders for this visit:    1. Sleep apnea, unspecified type (Primary)           Follow Up   Return for Next scheduled follow up.  Patient was given instructions and counseling regarding his condition or for health maintenance advice. Please see specific information pulled into the AVS if appropriate.

## 2025-07-04 ENCOUNTER — PATIENT MESSAGE (OUTPATIENT)
Dept: FAMILY MEDICINE CLINIC | Facility: CLINIC | Age: 65
End: 2025-07-04
Payer: MEDICARE

## 2025-07-04 DIAGNOSIS — I10 HYPERTENSION, UNSPECIFIED TYPE: ICD-10-CM

## 2025-07-09 RX ORDER — LOSARTAN POTASSIUM 50 MG/1
50 TABLET ORAL DAILY
Qty: 30 TABLET | Refills: 0 | Status: SHIPPED | OUTPATIENT
Start: 2025-07-09 | End: 2025-07-09

## 2025-07-09 RX ORDER — LOSARTAN POTASSIUM 50 MG/1
50 TABLET ORAL DAILY
Qty: 30 TABLET | Refills: 0 | Status: SHIPPED | OUTPATIENT
Start: 2025-07-09

## 2025-08-05 DIAGNOSIS — I10 HYPERTENSION, UNSPECIFIED TYPE: ICD-10-CM

## 2025-08-05 RX ORDER — LOSARTAN POTASSIUM 50 MG/1
50 TABLET ORAL DAILY
Qty: 30 TABLET | Refills: 0 | Status: SHIPPED | OUTPATIENT
Start: 2025-08-05

## (undated) DEVICE — DRSNG WND GZ CURAD OIL EMULSION 3X3IN STRL

## (undated) DEVICE — DECANTER: Brand: UNBRANDED

## (undated) DEVICE — SLV SCD CALF HEMOFORCE DVT THERP REPROC MD

## (undated) DEVICE — KT SURG TURNOVER 050

## (undated) DEVICE — GLV SURG SIGNATURE ESSENTIAL PF LTX SZ7

## (undated) DEVICE — SOL IRRIG NACL 1000ML

## (undated) DEVICE — SUT ETHLN FS1 4/0 18IN 1629H BX/36

## (undated) DEVICE — BNDG ESMARK 4IN 12FT LF STRL BLU

## (undated) DEVICE — BNDG,ELSTC,MATRIX,STRL,4"X5YD,LF,HOOK&LP: Brand: MEDLINE

## (undated) DEVICE — PAD UNDERCAST WYTEX 6IN 4YD LF STRL

## (undated) DEVICE — BNDG,ELSTC,MATRIX,STRL,6"X5YD,LF,HOOK&LP: Brand: MEDLINE

## (undated) DEVICE — ANTIBACTERIAL UNDYED BRAIDED (POLYGLACTIN 910), SYNTHETIC ABSORBABLE SUTURE: Brand: COATED VICRYL

## (undated) DEVICE — SPNG GZ WOVN 4X4IN 12PLY 10/BX STRL

## (undated) DEVICE — GOWN,REINFORCE,POLY,SIRUS,BREATH SLV,XLG: Brand: MEDLINE

## (undated) DEVICE — PK EXTREM 50

## (undated) DEVICE — BANDAGE,ELASTIC,ESMARK,STERILE,6"X9',LF: Brand: MEDLINE

## (undated) DEVICE — DISPOSABLE TOURNIQUET CUFF 18"X4", 1-LINE, RED, STERILE, 1EA/PK, 10PK/CS: Brand: ASP MEDICAL

## (undated) DEVICE — DRP SURG U/DRP INVISISHIELD PA 48X52IN

## (undated) DEVICE — UNDERGLV SURG BIOGEL INDICAT PF 71/2 GRN

## (undated) DEVICE — MICRO SAGITTAL BLADE (9.5 X 0.4 X 25.5MM)

## (undated) DEVICE — INTENDED FOR TISSUE SEPARATION, AND OTHER PROCEDURES THAT REQUIRE A SHARP SURGICAL BLADE TO PUNCTURE OR CUT.: Brand: BARD-PARKER ® CARBON RIB-BACK BLADES

## (undated) DEVICE — UNDRGLV SURG BIOGEL PIMICROINDICATOR SYNTH SZ8 LF STRL

## (undated) DEVICE — GLV SURG SENSICARE PI ORTHO SZ8 LF STRL

## (undated) DEVICE — APPL CHLORAPREP HI/LITE TINTED 10.5ML ORNG

## (undated) DEVICE — C-ARM: Brand: UNBRANDED

## (undated) DEVICE — UNDERGLV SURG BIOGEL/PI PF SYNTH SURG SZ8.5 BLU 50/BX

## (undated) DEVICE — SOLUTION,WATER,IRRIGATION,1000ML,STERILE: Brand: MEDLINE

## (undated) DEVICE — PAD CAST SYN PROTOUCH RL 4IN NS

## (undated) DEVICE — GAUZE,SPONGE,FLUFF,6"X6.75",STRL,5/TRAY: Brand: MEDLINE

## (undated) DEVICE — GLV SURG SIGNATURE ESSENTIAL PF LTX SZ8.5

## (undated) DEVICE — PENCL SMOKE/EVAC MEGADYNE TELESCP 15FT

## (undated) DEVICE — NDL HYPO PRECISIONGLIDE/REG 18G 1IN PNK

## (undated) DEVICE — DRAPE,U/ SHT,SPLIT,PLAS,STERIL: Brand: MEDLINE

## (undated) DEVICE — UNDERGLV SURG BIOGEL INDICAT PI SZ8 BLU

## (undated) DEVICE — STAPLER, SKIN, 35W, A: Brand: MEDLINE INDUSTRIES, INC.

## (undated) DEVICE — DRAPE,TOWEL,LARGE,INVISISHIELD: Brand: MEDLINE

## (undated) DEVICE — DISPOSABLE TOURNIQUET CUFF 30"X4", 1-LINE, WHITE, STERILE, 1EA/PK, 10PK/CS: Brand: ASP MEDICAL